# Patient Record
Sex: MALE | Race: WHITE | NOT HISPANIC OR LATINO | Employment: UNEMPLOYED | URBAN - METROPOLITAN AREA
[De-identification: names, ages, dates, MRNs, and addresses within clinical notes are randomized per-mention and may not be internally consistent; named-entity substitution may affect disease eponyms.]

---

## 2021-04-22 ENCOUNTER — APPOINTMENT (OUTPATIENT)
Dept: GENERAL RADIOLOGY | Facility: HOSPITAL | Age: 41
End: 2021-04-22

## 2021-04-22 ENCOUNTER — HOSPITAL ENCOUNTER (EMERGENCY)
Facility: HOSPITAL | Age: 41
Discharge: HOME | End: 2021-04-22
Attending: EMERGENCY MEDICINE | Admitting: EMERGENCY MEDICINE

## 2021-04-22 VITALS
BODY MASS INDEX: 35.78 KG/M2 | HEART RATE: 91 BPM | WEIGHT: 270 LBS | DIASTOLIC BLOOD PRESSURE: 84 MMHG | RESPIRATION RATE: 18 BRPM | OXYGEN SATURATION: 96 % | HEIGHT: 73 IN | SYSTOLIC BLOOD PRESSURE: 161 MMHG | TEMPERATURE: 98.1 F

## 2021-04-22 DIAGNOSIS — R00.2 PALPITATIONS: Primary | ICD-10-CM

## 2021-04-22 DIAGNOSIS — I49.1 PAC (PREMATURE ATRIAL CONTRACTION): ICD-10-CM

## 2021-04-22 LAB
ALBUMIN SERPL-MCNC: 4.8 G/DL (ref 3.5–5.2)
ALBUMIN/GLOB SERPL: 1.7 G/DL
ALP SERPL-CCNC: 99 U/L (ref 39–117)
ALT SERPL W P-5'-P-CCNC: 30 U/L (ref 1–41)
ANION GAP SERPL CALCULATED.3IONS-SCNC: 11 MMOL/L (ref 5–15)
AST SERPL-CCNC: 24 U/L (ref 1–40)
BASOPHILS # BLD AUTO: 0.07 10*3/MM3 (ref 0–0.2)
BASOPHILS NFR BLD AUTO: 1 % (ref 0–1.5)
BILIRUB SERPL-MCNC: 0.4 MG/DL (ref 0–1.2)
BUN SERPL-MCNC: 18 MG/DL (ref 6–20)
BUN/CREAT SERPL: 20 (ref 7–25)
CALCIUM SPEC-SCNC: 9.6 MG/DL (ref 8.6–10.5)
CHLORIDE SERPL-SCNC: 102 MMOL/L (ref 98–107)
CO2 SERPL-SCNC: 27 MMOL/L (ref 22–29)
CREAT SERPL-MCNC: 0.9 MG/DL (ref 0.76–1.27)
DEPRECATED RDW RBC AUTO: 38.7 FL (ref 37–54)
EOSINOPHIL # BLD AUTO: 0.27 10*3/MM3 (ref 0–0.4)
EOSINOPHIL NFR BLD AUTO: 3.8 % (ref 0.3–6.2)
ERYTHROCYTE [DISTWIDTH] IN BLOOD BY AUTOMATED COUNT: 12.5 % (ref 12.3–15.4)
GFR SERPL CREATININE-BSD FRML MDRD: 94 ML/MIN/1.73
GLOBULIN UR ELPH-MCNC: 2.8 GM/DL
GLUCOSE SERPL-MCNC: 107 MG/DL (ref 65–99)
HCT VFR BLD AUTO: 39.5 % (ref 37.5–51)
HGB BLD-MCNC: 13.3 G/DL (ref 13–17.7)
HOLD SPECIMEN: NORMAL
IMM GRANULOCYTES # BLD AUTO: 0.02 10*3/MM3 (ref 0–0.05)
IMM GRANULOCYTES NFR BLD AUTO: 0.3 % (ref 0–0.5)
LYMPHOCYTES # BLD AUTO: 2.23 10*3/MM3 (ref 0.7–3.1)
LYMPHOCYTES NFR BLD AUTO: 31.7 % (ref 19.6–45.3)
MAGNESIUM SERPL-MCNC: 2.1 MG/DL (ref 1.6–2.6)
MCH RBC QN AUTO: 28.8 PG (ref 26.6–33)
MCHC RBC AUTO-ENTMCNC: 33.7 G/DL (ref 31.5–35.7)
MCV RBC AUTO: 85.5 FL (ref 79–97)
MONOCYTES # BLD AUTO: 0.71 10*3/MM3 (ref 0.1–0.9)
MONOCYTES NFR BLD AUTO: 10.1 % (ref 5–12)
NEUTROPHILS NFR BLD AUTO: 3.74 10*3/MM3 (ref 1.7–7)
NEUTROPHILS NFR BLD AUTO: 53.1 % (ref 42.7–76)
NRBC BLD AUTO-RTO: 0 /100 WBC (ref 0–0.2)
NT-PROBNP SERPL-MCNC: 32.2 PG/ML (ref 0–450)
PLATELET # BLD AUTO: 228 10*3/MM3 (ref 140–450)
PMV BLD AUTO: 10 FL (ref 6–12)
POTASSIUM SERPL-SCNC: 3.7 MMOL/L (ref 3.5–5.2)
PROT SERPL-MCNC: 7.6 G/DL (ref 6–8.5)
QT INTERVAL: 352 MS
QTC INTERVAL: 456 MS
RBC # BLD AUTO: 4.62 10*6/MM3 (ref 4.14–5.8)
SODIUM SERPL-SCNC: 140 MMOL/L (ref 136–145)
TROPONIN T SERPL-MCNC: <0.01 NG/ML (ref 0–0.03)
TSH SERPL DL<=0.05 MIU/L-ACNC: 2.74 UIU/ML (ref 0.27–4.2)
WBC # BLD AUTO: 7.04 10*3/MM3 (ref 3.4–10.8)
WHOLE BLOOD HOLD SPECIMEN: NORMAL
WHOLE BLOOD HOLD SPECIMEN: NORMAL

## 2021-04-22 PROCEDURE — 93005 ELECTROCARDIOGRAM TRACING: CPT | Performed by: EMERGENCY MEDICINE

## 2021-04-22 PROCEDURE — 84443 ASSAY THYROID STIM HORMONE: CPT | Performed by: EMERGENCY MEDICINE

## 2021-04-22 PROCEDURE — 85025 COMPLETE CBC W/AUTO DIFF WBC: CPT | Performed by: EMERGENCY MEDICINE

## 2021-04-22 PROCEDURE — 80053 COMPREHEN METABOLIC PANEL: CPT | Performed by: EMERGENCY MEDICINE

## 2021-04-22 PROCEDURE — 83735 ASSAY OF MAGNESIUM: CPT | Performed by: EMERGENCY MEDICINE

## 2021-04-22 PROCEDURE — 99284 EMERGENCY DEPT VISIT MOD MDM: CPT

## 2021-04-22 PROCEDURE — 83880 ASSAY OF NATRIURETIC PEPTIDE: CPT | Performed by: EMERGENCY MEDICINE

## 2021-04-22 PROCEDURE — 84484 ASSAY OF TROPONIN QUANT: CPT | Performed by: EMERGENCY MEDICINE

## 2021-04-22 RX ORDER — LOSARTAN POTASSIUM AND HYDROCHLOROTHIAZIDE 25; 100 MG/1; MG/1
1 TABLET ORAL DAILY
COMMUNITY
End: 2022-08-02 | Stop reason: SDUPTHER

## 2021-04-22 RX ORDER — SODIUM CHLORIDE 0.9 % (FLUSH) 0.9 %
10 SYRINGE (ML) INJECTION AS NEEDED
Status: DISCONTINUED | OUTPATIENT
Start: 2021-04-22 | End: 2021-04-22 | Stop reason: HOSPADM

## 2021-04-22 RX ORDER — PRAVASTATIN SODIUM 40 MG
40 TABLET ORAL NIGHTLY
COMMUNITY
End: 2022-08-02 | Stop reason: SDUPTHER

## 2021-04-22 RX ORDER — HYDROXYZINE HYDROCHLORIDE 25 MG/1
25 TABLET, FILM COATED ORAL 3 TIMES DAILY PRN
COMMUNITY
End: 2021-04-26 | Stop reason: CLARIF

## 2021-04-22 RX ORDER — BUPRENORPHINE HYDROCHLORIDE AND NALOXONE HYDROCHLORIDE DIHYDRATE 8; 2 MG/1; MG/1
1 TABLET SUBLINGUAL DAILY
COMMUNITY
End: 2022-09-08

## 2021-04-22 RX ORDER — SERTRALINE HYDROCHLORIDE 100 MG/1
100 TABLET, FILM COATED ORAL DAILY
COMMUNITY
End: 2022-09-09

## 2021-04-22 RX ORDER — PANTOPRAZOLE SODIUM 20 MG/1
20 TABLET, DELAYED RELEASE ORAL DAILY
COMMUNITY
End: 2022-08-02 | Stop reason: SDUPTHER

## 2021-04-22 RX ORDER — BUPROPION HYDROCHLORIDE 150 MG/1
150 TABLET, EXTENDED RELEASE ORAL 2 TIMES DAILY
COMMUNITY
End: 2021-04-26

## 2021-04-22 NOTE — ED PROVIDER NOTES
Subjective   Mr. Jeffers 40-year-old male presents the emergency department today having palpitations.  He states palpitations been going off and on for about the past year seem to be getting progressively worse.  He does not have chest pain associated with these.  He states he is actually cut back on caffeine and drinks very little because of these.  He states these do not make him short of breath but he is very aware of each episode.  The episodes only last for few seconds and then resolve spontaneously.  He does not smoke.  He does have a past medical history for drug abuse but has been clean for years.  Reports that he does have a history of hypertension and hyperlipidemia and anxiety.  No prior history of previous cardiac work-up including heart cath or stress test.  Nothing seems to exacerbate these other than thinking about it nothing seems to alleviate them.      History provided by:  Patient   used: No    Palpitations  Palpitations quality:  Irregular  Onset quality:  Sudden  Timing:  Intermittent  Progression:  Waxing and waning  Chronicity:  Recurrent  Context: anxiety    Context: not appetite suppressants, not blood loss, not bronchodilators, not caffeine, not dehydration, not exercise, not hyperventilation, not illicit drugs, not nicotine and not stimulant use    Relieved by:  Nothing  Worsened by:  Nothing  Ineffective treatments:  None tried  Associated symptoms: no back pain, no chest pressure, no diaphoresis, no dizziness, no hemoptysis, no lower extremity edema, no malaise/fatigue, no nausea, no near-syncope, no numbness, no PND, no shortness of breath, no vomiting and no weakness    Risk factors: stress    Risk factors: no diabetes mellitus, no heart disease, no hx of PE, no hx of thyroid disease, no hypercoagulable state and no hyperthyroidism        Review of Systems   Constitutional: Negative for diaphoresis, fever and malaise/fatigue.   Respiratory: Negative for hemoptysis,  chest tightness, shortness of breath and wheezing.    Cardiovascular: Positive for palpitations. Negative for PND and near-syncope.   Gastrointestinal: Negative for nausea and vomiting.   Genitourinary: Negative for dysuria, frequency and urgency.   Musculoskeletal: Negative for back pain and neck pain.   Skin: Negative for pallor and rash.   Neurological: Negative for dizziness, weakness and numbness.   Psychiatric/Behavioral: Negative.    All other systems reviewed and are negative.      Past Medical History:   Diagnosis Date   • Anxiety    • Hyperlipidemia    • Hypertension        No Known Allergies    History reviewed. No pertinent surgical history.    History reviewed. No pertinent family history.    Social History     Socioeconomic History   • Marital status:      Spouse name: Not on file   • Number of children: Not on file   • Years of education: Not on file   • Highest education level: Not on file   Tobacco Use   • Smoking status: Never Smoker   Substance and Sexual Activity   • Alcohol use: Never   • Drug use: Never           Objective   Physical Exam  Vitals and nursing note reviewed.   Constitutional:       Appearance: He is well-developed.      Comments: Warm pink dry afebrile noted on the monitor patient is having frequent PACs and these do correlate with the sensation has been having.   HENT:      Head: Normocephalic and atraumatic.      Right Ear: External ear normal.      Left Ear: External ear normal.      Nose: Nose normal.   Eyes:      General: No scleral icterus.     Conjunctiva/sclera: Conjunctivae normal.      Pupils: Pupils are equal, round, and reactive to light.   Neck:      Thyroid: No thyromegaly.   Cardiovascular:      Rate and Rhythm: Normal rate and regular rhythm.      Heart sounds: Normal heart sounds.   Pulmonary:      Effort: Pulmonary effort is normal. No respiratory distress.      Breath sounds: Normal breath sounds. No wheezing or rales.   Chest:      Chest wall: No  tenderness.   Abdominal:      General: Bowel sounds are normal. There is no distension.      Palpations: Abdomen is soft.      Tenderness: There is no abdominal tenderness.   Musculoskeletal:         General: Normal range of motion.      Cervical back: Normal range of motion.   Lymphadenopathy:      Cervical: No cervical adenopathy.   Skin:     General: Skin is warm and dry.   Neurological:      Mental Status: He is alert and oriented to person, place, and time.      Cranial Nerves: No cranial nerve deficit.      Coordination: Coordination normal.      Deep Tendon Reflexes: Reflexes are normal and symmetric. Reflexes normal.   Psychiatric:         Behavior: Behavior normal.         Thought Content: Thought content normal.         Judgment: Judgment normal.         Procedures           ED Course                                   Recent Results (from the past 24 hour(s))   ECG 12 Lead    Collection Time: 04/22/21  1:59 PM   Result Value Ref Range    QT Interval 352 ms    QTC Interval 456 ms   Comprehensive Metabolic Panel    Collection Time: 04/22/21  2:08 PM    Specimen: Blood   Result Value Ref Range    Glucose 107 (H) 65 - 99 mg/dL    BUN 18 6 - 20 mg/dL    Creatinine 0.90 0.76 - 1.27 mg/dL    Sodium 140 136 - 145 mmol/L    Potassium 3.7 3.5 - 5.2 mmol/L    Chloride 102 98 - 107 mmol/L    CO2 27.0 22.0 - 29.0 mmol/L    Calcium 9.6 8.6 - 10.5 mg/dL    Total Protein 7.6 6.0 - 8.5 g/dL    Albumin 4.80 3.50 - 5.20 g/dL    ALT (SGPT) 30 1 - 41 U/L    AST (SGOT) 24 1 - 40 U/L    Alkaline Phosphatase 99 39 - 117 U/L    Total Bilirubin 0.4 0.0 - 1.2 mg/dL    eGFR Non African Amer 94 >60 mL/min/1.73    Globulin 2.8 gm/dL    A/G Ratio 1.7 g/dL    BUN/Creatinine Ratio 20.0 7.0 - 25.0    Anion Gap 11.0 5.0 - 15.0 mmol/L   Magnesium    Collection Time: 04/22/21  2:08 PM    Specimen: Blood   Result Value Ref Range    Magnesium 2.1 1.6 - 2.6 mg/dL   Troponin    Collection Time: 04/22/21  2:08 PM    Specimen: Blood   Result Value  Ref Range    Troponin T <0.010 0.000 - 0.030 ng/mL   TSH    Collection Time: 04/22/21  2:08 PM    Specimen: Blood   Result Value Ref Range    TSH 2.740 0.270 - 4.200 uIU/mL   BNP    Collection Time: 04/22/21  2:08 PM    Specimen: Blood   Result Value Ref Range    proBNP 32.2 0.0 - 450.0 pg/mL   Light Blue Top    Collection Time: 04/22/21  2:08 PM   Result Value Ref Range    Extra Tube hold for add-on    Green Top (Gel)    Collection Time: 04/22/21  2:08 PM   Result Value Ref Range    Extra Tube Hold for add-ons.    Lavender Top    Collection Time: 04/22/21  2:08 PM   Result Value Ref Range    Extra Tube hold for add-on    Gold Top - SST    Collection Time: 04/22/21  2:08 PM   Result Value Ref Range    Extra Tube Hold for add-ons.    CBC Auto Differential    Collection Time: 04/22/21  2:08 PM    Specimen: Blood   Result Value Ref Range    WBC 7.04 3.40 - 10.80 10*3/mm3    RBC 4.62 4.14 - 5.80 10*6/mm3    Hemoglobin 13.3 13.0 - 17.7 g/dL    Hematocrit 39.5 37.5 - 51.0 %    MCV 85.5 79.0 - 97.0 fL    MCH 28.8 26.6 - 33.0 pg    MCHC 33.7 31.5 - 35.7 g/dL    RDW 12.5 12.3 - 15.4 %    RDW-SD 38.7 37.0 - 54.0 fl    MPV 10.0 6.0 - 12.0 fL    Platelets 228 140 - 450 10*3/mm3    Neutrophil % 53.1 42.7 - 76.0 %    Lymphocyte % 31.7 19.6 - 45.3 %    Monocyte % 10.1 5.0 - 12.0 %    Eosinophil % 3.8 0.3 - 6.2 %    Basophil % 1.0 0.0 - 1.5 %    Immature Grans % 0.3 0.0 - 0.5 %    Neutrophils, Absolute 3.74 1.70 - 7.00 10*3/mm3    Lymphocytes, Absolute 2.23 0.70 - 3.10 10*3/mm3    Monocytes, Absolute 0.71 0.10 - 0.90 10*3/mm3    Eosinophils, Absolute 0.27 0.00 - 0.40 10*3/mm3    Basophils, Absolute 0.07 0.00 - 0.20 10*3/mm3    Immature Grans, Absolute 0.02 0.00 - 0.05 10*3/mm3    nRBC 0.0 0.0 - 0.2 /100 WBC     Note: In addition to lab results from this visit, the labs listed above may include labs taken at another facility or during a different encounter within the last 24 hours. Please correlate lab times with ED admission and  discharge times for further clarification of the services performed during this visit.    No orders to display     Vitals:    04/22/21 1430 04/22/21 1445 04/22/21 1500 04/22/21 1501   BP: 166/92  161/84    BP Location:       Patient Position:       Pulse: 105 92  91   Resp:       Temp:       TempSrc:       SpO2: 97% 96% 96% 96%   Weight:       Height:         Medications   sodium chloride 0.9 % flush 10 mL (has no administration in time range)     ECG/EMG Results (last 24 hours)     Procedure Component Value Units Date/Time    ECG 12 Lead [[id number]] Collected: 04/22/21 1359     Updated: 04/22/21 1512        ECG 12 Lead   Final Result   Test Reason : HEART PALPS   Blood Pressure : **/** mmHG   Vent. Rate : 101 BPM     Atrial Rate : 101 BPM      P-R Int : 154 ms          QRS Dur : 110 ms       QT Int : 352 ms       P-R-T Axes : 046 077 042 degrees      QTc Int : 456 ms      Sinus tachycardia   Otherwise normal ECG   No previous ECGs available   Confirmed by RICHI MEDRANO MD ([id number]) on 4/22/2021 4:06:25 PM      Referred By:  EDMD           Confirmed By:RICHI MEDRANO MD                  MDM  Number of Diagnoses or Management Options  PAC (premature atrial contraction): new and requires workup  Palpitations: new and requires workup     Amount and/or Complexity of Data Reviewed  Clinical lab tests: reviewed and ordered  Tests in the radiology section of CPT®: reviewed and ordered  Tests in the medicine section of CPT®: reviewed and ordered  Discuss the patient with other providers: yes    Patient Progress  Patient progress: stable      Final diagnoses:   Palpitations   PAC (premature atrial contraction)       ED Disposition  ED Disposition     ED Disposition Condition Comment    Discharge Stable           [facility] CARDIOLOG[facility]  [address]  [facility] [address]  [phone]        Kaden Vázquez MD  [address] [place] [address] [address] [place] [address]  [phone]      Call for  appointment         Medication List      No changes were made to your prescriptions during this visit.          Sami Moy PA  04/22/21 2531

## 2021-04-23 NOTE — PROGRESS NOTES
"Chief Complaint  Establish Care and Palpitations    Subjective    History of Present Illness {CC  Problem List  Visit  Diagnosis   Encounters  Notes  Medications  Labs  Result Review Imaging  Media :23}     Iván Jeffers presents to [facility]ARDIOLOG[facility] for   History of Present Illness   40-year-old male with anxiety, hypertension and hyperlipidemia who presents today for evaluation of palpitations at the request of DESIREE Frank.  Patient presented to the ED on 4/22 with worsening intermittent palpitations.  He reports he has had intermittent palpitation for the past year but seem to be getting progressively worse.  He says he is actually cut back on caffeine with no significant improvement.  He denies any associated chest pain or shortness of breath. Work up in ED unremarkable. He reports that he stopped his buproprion and reports since then he has had no further palpitations.  Overall he says he feels really well.  He is making positive changes in his lifestyle.  He reports that he does vape and smoke but is working to quit both.  He typically drinks 1 cup of coffee and 1 soda a day.  He currently stays at home with his children but used to work as a paramedic and would drink [employer] drinks and excessive caffeine and stay up all night.  No history of cardiac evaluation in the past.  He reports he monitors his blood pressure at home typically is around 1 20-1 30  Objective     Vital Signs:   Vitals:    04/26/21 1309 04/26/21 1313 04/26/21 1315   BP: 148/81 171/94 162/91   BP Location: Right arm Left arm Left arm   Patient Position: Sitting Standing Sitting   Cuff Size: Large Adult Large Adult Large Adult   Pulse: 92 96 96   Resp:   18   Temp:   99 °F (37.2 °C)   TempSrc:   Temporal   SpO2: 97% 98% 99%   Weight:   122 kg (269 lb 8 oz)   Height:   185.4 cm (73\")     Body mass index is 35.56 kg/m².  Physical Exam  Vitals reviewed.   Constitutional:       Appearance: Normal appearance. "   HENT:      Head: Normocephalic.   Eyes:      Extraocular Movements: Extraocular movements intact.      Pupils: Pupils are equal, round, and reactive to light.   Neck:      Vascular: No carotid bruit.   Cardiovascular:      Rate and Rhythm: Normal rate and regular rhythm.      Pulses: Normal pulses.      Heart sounds: Normal heart sounds, S1 normal and S2 normal. No murmur heard.     Pulmonary:      Effort: Pulmonary effort is normal. No respiratory distress.      Breath sounds: Normal breath sounds.   Chest:      Chest wall: No tenderness.   Abdominal:      General: Abdomen is flat. Bowel sounds are normal.      Palpations: Abdomen is soft.   Musculoskeletal:      Cervical back: Neck supple.      Right lower leg: No edema.      Left lower leg: No edema.   Skin:     General: Skin is warm and dry.   Neurological:      General: No focal deficit present.      Mental Status: He is alert and oriented to person, place, and time. Mental status is at baseline.   Psychiatric:         Mood and Affect: Mood normal.         Behavior: Behavior normal.         Thought Content: Thought content normal.              Result Review  Data Reviewed:{ Labs  Result Review  Imaging  Med Tab  Media :23}   BNP (04/22/2021 14:08)  TSH (04/22/2021 14:08)  Troponin (04/22/2021 14:08)  Magnesium (04/22/2021 14:08)  Comprehensive Metabolic Panel (04/22/2021 14:08)  CBC & Differential (04/22/2021 14:08)  ECG 12 Lead (04/22/2021 13:59)    Consultant notes Sami Moy PAc            Assessment and Plan {CC Problem List  Visit Diagnosis  ROS  Review (Popup)  Health Maintenance  Quality  BestPractice  Medications  SmartSets  SnapShot Encounters  Media :23}   1. Palpitations  Symptoms resolved since stopping Wellbutrin  Since he is having no symptoms will defer cardiac monitor at this time.  Advised him to call with any recurrent symptoms    2. Essential hypertension  Elevated today but he climb multiple flights of stairs and  reports his blood pressure is usually elevated at doctor's office.  Home blood pressures are well controlled.  Continue losartan/HCTZ.  Encouraged weight loss, regular exercise and healthy diet    3. Hyperlipidemia, unspecified hyperlipidemia type  Continue statin therapy    4. Tobacco abuse  Discussed cardiac risks including hypertension, hyperlipidemia and tobacco abuse.  Strongly encouraged tobacco cessation.  He is ready and working to quit.        Follow Up {Instructions Charge Capture  Follow-up Communications :23}   No follow-ups on file.    Patient was given instructions and counseling regarding his condition or for health maintenance advice. Please see specific information pulled into the AVS if appropriate.  Patient was instructed to call the [facility] with any questions, concerns, or worsening symptoms.    *Please note that portions of this note were completed with a voice recognition program. Efforts were made to edit the dictations, but occasionally words are mistranscribed.

## 2021-04-26 ENCOUNTER — HOSPITAL ENCOUNTER (OUTPATIENT)
Dept: CARDIOLOGY | Facility: HOSPITAL | Age: 41
Discharge: HOME | End: 2021-04-26

## 2021-04-26 ENCOUNTER — OFFICE VISIT (OUTPATIENT)
Dept: CARDIOLOGY | Facility: HOSPITAL | Age: 41
End: 2021-04-26

## 2021-04-26 VITALS
HEART RATE: 96 BPM | TEMPERATURE: 99 F | HEIGHT: 73 IN | BODY MASS INDEX: 35.72 KG/M2 | DIASTOLIC BLOOD PRESSURE: 91 MMHG | OXYGEN SATURATION: 99 % | RESPIRATION RATE: 18 BRPM | SYSTOLIC BLOOD PRESSURE: 162 MMHG | WEIGHT: 269.5 LBS

## 2021-04-26 DIAGNOSIS — R00.2 PALPITATIONS: ICD-10-CM

## 2021-04-26 DIAGNOSIS — E78.5 HYPERLIPIDEMIA, UNSPECIFIED HYPERLIPIDEMIA TYPE: ICD-10-CM

## 2021-04-26 DIAGNOSIS — R00.2 PALPITATIONS: Primary | ICD-10-CM

## 2021-04-26 DIAGNOSIS — Z72.0 TOBACCO ABUSE: ICD-10-CM

## 2021-04-26 DIAGNOSIS — I10 ESSENTIAL HYPERTENSION: ICD-10-CM

## 2021-04-26 PROCEDURE — 93246 EXT ECG>7D<15D RECORDING: CPT

## 2021-04-26 PROCEDURE — 99203 OFFICE O/P NEW LOW 30 MIN: CPT | Performed by: NURSE PRACTITIONER

## 2021-04-26 RX ORDER — BUPROPION HYDROCHLORIDE 300 MG/1
300 TABLET ORAL DAILY
COMMUNITY
Start: 2021-04-12 | End: 2021-04-26 | Stop reason: HOSPADM

## 2021-04-26 RX ORDER — HYDROXYZINE PAMOATE 25 MG/1
25 CAPSULE ORAL 3 TIMES DAILY PRN
COMMUNITY
Start: 2021-04-16 | End: 2023-03-10 | Stop reason: SDUPTHER

## 2021-04-29 ENCOUNTER — TELEPHONE (OUTPATIENT)
Dept: CARDIOLOGY | Facility: HOSPITAL | Age: 41
End: 2021-04-29

## 2021-04-29 DIAGNOSIS — R00.2 PALPITATIONS: Primary | ICD-10-CM

## 2021-04-29 NOTE — TELEPHONE ENCOUNTER
I have placed order for 2 week holter. Will you please send to him and make sure that he gets a 6 week follow up with me via telemedicine?

## 2021-04-29 NOTE — TELEPHONE ENCOUNTER
[facility] Heart Monitor Documentation    Iván Jeffers  1980  [id number]  04/29/21    CHACHA Higgins    [] ZIO XT Patch  Model [id number] Prescribed for N/A Days    · Serial Number: (N + 9 Digits) N   · Apply-By Date on Box:   · USPS Tracking Number:   · USPS Tracking        [] [employer]ian MINI PLUS Mobile Cardiac Telemetry  Model BGMINIPLUS Prescribed for N/A Days    · Serial Number: (BGM + 7 Digits) BGM  · Shipped-By Date on Box:   · UPS Tracking Number: 1Z  · UPS Tracking      [x] [employer] BodyGuardian MINI Holter Monitor  Model BGMINIEL Prescribed for N/A Days    · Serial Number: (7 Digits)   · Shipped-By Date on Box:   · UPS Tracking Number: 1Z [employer] to send  · UPS Tracking        This monitor was applied to the patient's chest and checked for proper functioning.  Mr. Iván Jeffers was instructed in the proper use of this monitor.  He was given the opportunity to ask questions and left the office with the device 's instruction manual.    Tamara Valentino MA, 11:35 EDT, 04/29/21                  [facility]

## 2021-06-07 NOTE — PROGRESS NOTES
"Chief Complaint  Follow-up (Monitor results)  This was an audio and video enabled telemedicine encounter.    Subjective    History of Present Illness {CC  Problem List  Visit  Diagnosis   Encounters  Notes  Medications  Labs  Result Review Imaging  Media :23}     Iván Jeffers presents to [facility]IOLOG[facility] for   History of Present Illness   40-year-old male with anxiety, hypertension and hyperlipidemia who presents today to follow up on palpitations.  He wore a Holter monitor for 14 days which was negative for arrhythmias.  Triggered events were normal sinus rhythm, occasional PACs and PVCs. He reports since stopping vaping, his palpitations have resolved.  He strongly desires to quit smoking.      Objective     Vital Signs:   Vitals:    06/08/21 0842   BP: 132/82   BP Location: Left arm   Patient Position: Sitting   Pulse: 88   Weight: 122 kg (270 lb 1 oz)   Height: 185.4 cm (73\")     Body mass index is 35.63 kg/m².  Physical Exam  Constitutional:       Appearance: Normal appearance.   HENT:      Head: Normocephalic.   Pulmonary:      Effort: Pulmonary effort is normal. No respiratory distress.   Neurological:      Mental Status: He is alert and oriented to person, place, and time.   Psychiatric:         Mood and Affect: Mood normal.         Behavior: Behavior normal.         Thought Content: Thought content normal.              Result Review  Data Reviewed:{ Labs  Result Review  Imaging  Med Tab  Media :23}   Extended Holter monitor x14 days 5/2021 showed average heart of 78, minimum rate of 48 and a maximal rate of 130 bpm.  There were rare PACs and PVCs.  No significant arrhythmias noted.  Triggered events were normal sinus rhythm, occasional PACs and PVCs           Assessment and Plan {CC Problem List  Visit Diagnosis  ROS  Review (Popup)  Health Maintenance  Quality  BestPractice  Medications  SmartSets  SnapShot Encounters  Media :23}   1. Palpitations  Symptoms " have resolved. Symptoms mostly NSR and PACs.  Encouraged to avoid vaping, cardiac stimulants, minimize caffeine and encouraged weight loss.     2. Essential hypertension  Appears controlled  Continue to monitor   Discussed importance of good BP control with most SBPs 130 or less    3. Tobacco abuse  Smoking cessation counseling provided for 5 minutes.  Various cessation strategies discussed including nicotine replacement therapy, bupropion, chantix. Discussed risks of ongoing tobacco abuse. He would like to try Chantix.  Discussed potential side effects and risk versus benefits.  He agrees to proceed.      Follow Up {Instructions Charge Capture  Follow-up Communications :23}   No follow-ups on file.    Patient was given instructions and counseling regarding his condition or for health maintenance advice. Please see specific information pulled into the AVS if appropriate.  Patient was instructed to call the [facility] with any questions, concerns, or worsening symptoms.    *Please note that portions of this note were completed with a voice recognition program. Efforts were made to edit the dictations, but occasionally words are mistranscribed.

## 2021-06-08 ENCOUNTER — TELEMEDICINE (OUTPATIENT)
Dept: CARDIOLOGY | Facility: HOSPITAL | Age: 41
End: 2021-06-08

## 2021-06-08 VITALS
SYSTOLIC BLOOD PRESSURE: 132 MMHG | BODY MASS INDEX: 35.79 KG/M2 | DIASTOLIC BLOOD PRESSURE: 82 MMHG | HEIGHT: 73 IN | HEART RATE: 88 BPM | WEIGHT: 270.06 LBS

## 2021-06-08 DIAGNOSIS — Z72.0 TOBACCO ABUSE: ICD-10-CM

## 2021-06-08 DIAGNOSIS — I10 ESSENTIAL HYPERTENSION: ICD-10-CM

## 2021-06-08 DIAGNOSIS — R00.2 PALPITATIONS: Primary | ICD-10-CM

## 2021-06-08 PROCEDURE — 99213 OFFICE O/P EST LOW 20 MIN: CPT | Performed by: NURSE PRACTITIONER

## 2021-06-08 RX ORDER — VARENICLINE TARTRATE 1 MG/1
1 TABLET, FILM COATED ORAL 2 TIMES DAILY
Qty: 56 TABLET | Refills: 1 | Status: SHIPPED | OUTPATIENT
Start: 2021-07-06 | End: 2021-08-31

## 2021-06-17 PROCEDURE — 93248 EXT ECG>7D<15D REV&INTERPJ: CPT | Performed by: INTERNAL MEDICINE

## 2022-08-02 RX ORDER — PRAVASTATIN SODIUM 40 MG
40 TABLET ORAL NIGHTLY
Qty: 90 TABLET | Refills: 0 | Status: SHIPPED | OUTPATIENT
Start: 2022-08-02 | End: 2022-11-30

## 2022-08-02 RX ORDER — LOSARTAN POTASSIUM AND HYDROCHLOROTHIAZIDE 25; 100 MG/1; MG/1
1 TABLET ORAL DAILY
Qty: 90 TABLET | Refills: 0 | Status: SHIPPED | OUTPATIENT
Start: 2022-08-02 | End: 2022-09-08

## 2022-08-02 RX ORDER — PANTOPRAZOLE SODIUM 20 MG/1
20 TABLET, DELAYED RELEASE ORAL DAILY
Qty: 90 TABLET | Refills: 0 | Status: SHIPPED | OUTPATIENT
Start: 2022-08-02 | End: 2022-11-30

## 2022-08-02 RX ORDER — BUSPIRONE HYDROCHLORIDE 7.5 MG/1
7.5 TABLET ORAL 3 TIMES DAILY
Qty: 90 TABLET | Refills: 0 | Status: SHIPPED | OUTPATIENT
Start: 2022-08-02 | End: 2022-09-09

## 2022-08-02 NOTE — TELEPHONE ENCOUNTER
Appropriate to fill the medicines for 90 days but patient will need follow-up prior to next fill of medicine.

## 2022-08-02 NOTE — TELEPHONE ENCOUNTER
Spoke with patient a he is scheduled with Ana Smart on 10/21/22 @ 10.45. Patient has not had labs drawn in over a year

## 2022-09-08 ENCOUNTER — TELEPHONE (OUTPATIENT)
Dept: FAMILY MEDICINE CLINIC | Facility: CLINIC | Age: 42
End: 2022-09-08

## 2022-09-08 RX ORDER — LOSARTAN POTASSIUM AND HYDROCHLOROTHIAZIDE 12.5; 1 MG/1; MG/1
1 TABLET ORAL DAILY
COMMUNITY
Start: 2022-07-07 | End: 2023-03-10 | Stop reason: SDUPTHER

## 2022-09-08 RX ORDER — BUPRENORPHINE AND NALOXONE 8; 2 MG/1; MG/1
FILM, SOLUBLE BUCCAL; SUBLINGUAL
COMMUNITY
Start: 2022-08-29

## 2022-09-08 RX ORDER — COVID-19 MOLECULAR TEST ASSAY
KIT MISCELLANEOUS
COMMUNITY
Start: 2022-09-06

## 2022-09-08 NOTE — TELEPHONE ENCOUNTER
Caller: Carrie Jeffers    Relationship to patient: Self    Best call back number: [phone]    Chief complaint: SWOLLEN LEGS, MEDICATIONS     Type of visit: NEW PATIENT     Requested date: ASAP    If rescheduling, when is the original appointment: N/A    Additional notes: CARRIE JEFFERS WAS UNDER THE CARE OF KRISTAN FITZGERALD. HE HAS CHILDREN THAT SEE DR ANA FITZGERALD AND HE WOULD LIKE TO BE UNDER HIS CARE AS WELL. PLEASE ADVISE IF THIS CAN BE APPROVED.

## 2022-09-09 ENCOUNTER — OFFICE VISIT (OUTPATIENT)
Dept: FAMILY MEDICINE CLINIC | Facility: CLINIC | Age: 42
End: 2022-09-09

## 2022-09-09 VITALS
OXYGEN SATURATION: 98 % | TEMPERATURE: 98.2 F | RESPIRATION RATE: 12 BRPM | SYSTOLIC BLOOD PRESSURE: 151 MMHG | WEIGHT: 295.4 LBS | BODY MASS INDEX: 40.01 KG/M2 | HEART RATE: 86 BPM | DIASTOLIC BLOOD PRESSURE: 90 MMHG | HEIGHT: 72 IN

## 2022-09-09 DIAGNOSIS — F10.11 ALCOHOL ABUSE, IN REMISSION: ICD-10-CM

## 2022-09-09 DIAGNOSIS — Z11.59 NEED FOR HEPATITIS C SCREENING TEST: ICD-10-CM

## 2022-09-09 DIAGNOSIS — R06.81 APNEA: ICD-10-CM

## 2022-09-09 DIAGNOSIS — E66.01 MORBID OBESITY: ICD-10-CM

## 2022-09-09 DIAGNOSIS — R60.0 LOCALIZED EDEMA: Primary | ICD-10-CM

## 2022-09-09 DIAGNOSIS — Z11.4 SCREENING FOR HIV (HUMAN IMMUNODEFICIENCY VIRUS): ICD-10-CM

## 2022-09-09 DIAGNOSIS — E78.2 MIXED HYPERLIPIDEMIA: ICD-10-CM

## 2022-09-09 DIAGNOSIS — Z23 NEED FOR PROPHYLACTIC VACCINATION AND INOCULATION AGAINST INFLUENZA: ICD-10-CM

## 2022-09-09 DIAGNOSIS — F11.11 OPIOID ABUSE, IN REMISSION: ICD-10-CM

## 2022-09-09 DIAGNOSIS — F17.210 CIGARETTE SMOKER: ICD-10-CM

## 2022-09-09 DIAGNOSIS — I10 PRIMARY HYPERTENSION: ICD-10-CM

## 2022-09-09 PROBLEM — K21.9 GERD WITHOUT ESOPHAGITIS: Status: ACTIVE | Noted: 2022-09-09

## 2022-09-09 PROBLEM — F41.1 GENERALIZED ANXIETY DISORDER: Status: ACTIVE | Noted: 2022-09-09

## 2022-09-09 PROBLEM — F90.9 ATTENTION DEFICIT HYPERACTIVITY DISORDER: Status: ACTIVE | Noted: 2022-09-09

## 2022-09-09 PROBLEM — F11.10 OPIATE ABUSE, EPISODIC (HCC): Status: ACTIVE | Noted: 2022-09-09

## 2022-09-09 PROBLEM — E78.5 HYPERLIPIDEMIA: Status: ACTIVE | Noted: 2022-09-09

## 2022-09-09 PROBLEM — E29.1 TESTICULAR HYPOFUNCTION: Status: ACTIVE | Noted: 2022-09-09

## 2022-09-09 PROBLEM — F42.9 OCD (OBSESSIVE COMPULSIVE DISORDER): Status: ACTIVE | Noted: 2022-09-09

## 2022-09-09 PROCEDURE — 90688 IIV4 VACCINE SPLT 0.5 ML IM: CPT | Performed by: INTERNAL MEDICINE

## 2022-09-09 PROCEDURE — 90471 IMMUNIZATION ADMIN: CPT | Performed by: INTERNAL MEDICINE

## 2022-09-09 PROCEDURE — 99214 OFFICE O/P EST MOD 30 MIN: CPT | Performed by: INTERNAL MEDICINE

## 2022-09-09 RX ORDER — SERTRALINE HYDROCHLORIDE 100 MG/1
100 TABLET, FILM COATED ORAL DAILY
COMMUNITY
End: 2022-12-05 | Stop reason: SDUPTHER

## 2022-09-09 RX ORDER — BUSPIRONE HYDROCHLORIDE 7.5 MG/1
7.5 TABLET ORAL 2 TIMES DAILY
COMMUNITY
End: 2022-10-03

## 2022-09-09 NOTE — PROGRESS NOTES
Follow Up Office Visit      Date: 2022   Patient Name: Iván Jeffers  : 1980   MRN: [id number]     Chief Complaint:    Chief Complaint   Patient presents with   • Leg Swelling       History of Present Illness: Iván Jeffers is a 42 y.o. male who is here with complaint of several months of intermittent dependent edema bilaterally, little bit more on the left than the right, noting he had been drinking alcohol excessively for a number of months and when he stopped drinking this edema seem to get better.  When he resumed it got worse again.  His total consumption was about 8 or 10 shots of bourbon weekly.  He stopped drinking again about a week and a half ago but notes the swelling is not completely gotten better.  He denies chest pains palpitations dyspnea orthopnea or PND.  Upon targeted questioning he does admit that he has had witnessed apnea by his wife, patient having morbid obesity with body habitus typical of sleep apnea.  He is interested in pursuing a sleep study.  He does have hypertension on therapy, including a diuretic, with blood pressures averaging 126-130/75-80.  History of opiate abuse in remission for years, on Suboxone therapy.  History of anxiety disorder generally compensated on BuSpar and sertraline.  Current with COVID-19 vaccine, has not yet had a flu vaccine this fall.  Has not had any labs done for an extensive period of time now..    Subjective      Review of Systems:   Review of Systems    I have reviewed the patients family history, social history, past medical history, past surgical history and have updated it as appropriate.     Medications:     Current Outpatient Medications:   •  buprenorphine-naloxone (SUBOXONE) 8-2 MG film film, , Disp: , Rfl:   •  busPIRone (BUSPAR) 7.5 MG tablet, Take 7.5 mg by mouth 2 (Two) Times a Day., Disp: , Rfl:   •  hydrOXYzine pamoate (VISTARIL) 25 MG capsule, Take 25 mg by mouth 3 (Three) Times a Day As Needed for Anxiety., Disp: , Rfl:   •  " losartan-hydrochlorothiazide (HYZAAR) 100-12.5 MG per tablet, Take 1 tablet by mouth Daily., Disp: , Rfl:   •  pantoprazole (PROTONIX) 20 MG EC tablet, Take 1 tablet by mouth Daily., Disp: 90 tablet, Rfl: 0  •  pravastatin (PRAVACHOL) 40 MG tablet, Take 1 tablet by mouth Every Night., Disp: 90 tablet, Rfl: 0  •  sertraline (ZOLOFT) 100 MG tablet, Take 100 mg by mouth Daily. 1.5 tablets daily, Disp: , Rfl:   •  BinaxNOW COVID-19 Ag Home Test kit, , Disp: , Rfl:     Allergies:   No Known Allergies    Objective     Physical Exam: Please see above  Vital Signs:   Vitals:    09/09/22 1041   BP: 151/90   BP Location: Left arm   Patient Position: Sitting   Cuff Size: Large Adult   Pulse: 86   Resp: 12   Temp: 98.2 °F (36.8 °C)   TempSrc: Temporal   SpO2: 98%   Weight: 134 kg (295 lb 6.4 oz)   Height: 182.2 cm (71.75\")     Body mass index is 40.34 kg/m².  Class 3 Severe Obesity (BMI >=40). Obesity-related health conditions include the following: obstructive sleep apnea, hypertension and dyslipidemias. Obesity is unchanged. BMI is is above average; BMI management plan is completed. We discussed low calorie, low carb based diet program, portion control and increasing exercise.       Physical Exam  General: Taller statured pleasant healthy-appearing 42-year-old white male with a BMI of 40.3.  Oropharynx with Mallampati class IV, upper denture plate, neck circumference 18 inches with no adenopathy masses or tenderness  Lungs clear with no wheeze tachypnea or cough  Cardiac regular rate rhythm with no murmurs gallops or rubs,   Lower extremities having very mild nonpitting's stasis edema in his forelegs extending down to the ankles, no bipedal edema, negative Homans' sign with no significant palpable tenderness in his lower extremities no erythema of the skin.  Procedures    Results:   Labs:   TSH   Date Value Ref Range Status   04/22/2021 2.740 0.270 - 4.200 uIU/mL Final        POCT Results (if applicable):   Results for " orders placed or performed during the hospital encounter of 04/22/21   Comprehensive Metabolic Panel    Specimen: Blood   Result Value Ref Range    Glucose 107 (H) 65 - 99 mg/dL    BUN 18 6 - 20 mg/dL    Creatinine 0.90 0.76 - 1.27 mg/dL    Sodium 140 136 - 145 mmol/L    Potassium 3.7 3.5 - 5.2 mmol/L    Chloride 102 98 - 107 mmol/L    CO2 27.0 22.0 - 29.0 mmol/L    Calcium 9.6 8.6 - 10.5 mg/dL    Total Protein 7.6 6.0 - 8.5 g/dL    Albumin 4.80 3.50 - 5.20 g/dL    ALT (SGPT) 30 1 - 41 U/L    AST (SGOT) 24 1 - 40 U/L    Alkaline Phosphatase 99 39 - 117 U/L    Total Bilirubin 0.4 0.0 - 1.2 mg/dL    eGFR Non African Amer 94 >60 mL/min/1.73    Globulin 2.8 gm/dL    A/G Ratio 1.7 g/dL    BUN/Creatinine Ratio 20.0 7.0 - 25.0    Anion Gap 11.0 5.0 - 15.0 mmol/L   Magnesium    Specimen: Blood   Result Value Ref Range    Magnesium 2.1 1.6 - 2.6 mg/dL   Troponin    Specimen: Blood   Result Value Ref Range    Troponin T <0.010 0.000 - 0.030 ng/mL   TSH    Specimen: Blood   Result Value Ref Range    TSH 2.740 0.270 - 4.200 uIU/mL   BNP    Specimen: Blood   Result Value Ref Range    proBNP 32.2 0.0 - 450.0 pg/mL   CBC Auto Differential    Specimen: Blood   Result Value Ref Range    WBC 7.04 3.40 - 10.80 10*3/mm3    RBC 4.62 4.14 - 5.80 10*6/mm3    Hemoglobin 13.3 13.0 - 17.7 g/dL    Hematocrit 39.5 37.5 - 51.0 %    MCV 85.5 79.0 - 97.0 fL    MCH 28.8 26.6 - 33.0 pg    MCHC 33.7 31.5 - 35.7 g/dL    RDW 12.5 12.3 - 15.4 %    RDW-SD 38.7 37.0 - 54.0 fl    MPV 10.0 6.0 - 12.0 fL    Platelets 228 140 - 450 10*3/mm3    Neutrophil % 53.1 42.7 - 76.0 %    Lymphocyte % 31.7 19.6 - 45.3 %    Monocyte % 10.1 5.0 - 12.0 %    Eosinophil % 3.8 0.3 - 6.2 %    Basophil % 1.0 0.0 - 1.5 %    Immature Grans % 0.3 0.0 - 0.5 %    Neutrophils, Absolute 3.74 1.70 - 7.00 10*3/mm3    Lymphocytes, Absolute 2.23 0.70 - 3.10 10*3/mm3    Monocytes, Absolute 0.71 0.10 - 0.90 10*3/mm3    Eosinophils, Absolute 0.27 0.00 - 0.40 10*3/mm3    Basophils, Absolute  0.07 0.00 - 0.20 10*3/mm3    Immature Grans, Absolute 0.02 0.00 - 0.05 10*3/mm3    nRBC 0.0 0.0 - 0.2 /100 WBC   ECG 12 Lead   Result Value Ref Range    QT Interval 352 ms    QTC Interval 456 ms   Light Blue Top   Result Value Ref Range    Extra Tube hold for add-on    Green Top (Gel)   Result Value Ref Range    Extra Tube Hold for add-ons.    Lavender Top   Result Value Ref Range    Extra Tube hold for add-on    Gold Top - SST   Result Value Ref Range    Extra Tube Hold for add-ons.    Gray Top   Result Value Ref Range    Extra Tube Hold for add-ons.        Imaging:   No valid procedures specified.     Smoking Cessation:   3-10 mintues spent counseling Will try to cut down    Assessment / Plan      Assessment/Plan:   Diagnoses and all orders for this visit:    1. Localized edema (Primary)  -     proBNP; Future  Patient appears to have mild stasis edema, not likely related medications noting he is on low-dose diuretic already.  No clinical signs of a DVT or cellulitis.  Unlikely related to left heart failure though could be potentially related to some right heart failure with suspected ROCKY as detailed below pursuing work-up.  For now keep feet elevated when sitting down, check BNP and make further recommendation accordingly.  2. Mixed hyperlipidemia  -     TSH Rfx On Abnormal To Free T4; Future  -     Hemoglobin A1c; Future  -     Lipid Panel; Future  On statin.  Update lipid profile  3. Primary hypertension  -     TSH Rfx On Abnormal To Free T4; Future  -     Comprehensive Metabolic Panel; Future  -     Hemoglobin A1c; Future  -     Lipid Panel; Future  -     Urinalysis With Culture If Indicated -; Future  Stage II elevation acutely, chronically well controlled on current antihypertensive regimen.  We will continue same.  4. Cigarette smoker  Oertli smoking 2 or 3 cigarettes daily.  Strongly counseled patient regarding need to stop smoking for health risk reduction.  He does seem motivated to do so.  5. Morbid  obesity (HCC)  -     TSH Rfx On Abnormal To Free T4; Future  -     Hemoglobin A1c; Future  Discussed need for lifestyle modification to pursue weight loss.  We will initially try lifestyle change with diet and exercise and if not helpful then we will talk about targeted medication therapy, also consideration in the future for bariatric surgery.  6. Apnea  -     Ambulatory Referral to Cardiology  Witnessed apnea, suspected ROCKY.  Refer to Dr. Ryan or Dr. Clary Martin of sleep medicine/cardiology for further evaluation.  7. Need for hepatitis C screening test  -     Hepatitis C Antibody; Future  Check recommended screen  8. Screening for HIV (human immunodeficiency virus)  -     Human Immunodeficiency Virus 1 & 2 (HIV-1/HIV-2), Qualitative, RNA; Future  Check recommended screen  9. Alcohol abuse, in remission  By history abstaining.  Encouraged ongoing abstinence.  10. Opioid abuse, in remission (HCC)  Doing well on Suboxone by history.  11. Need for prophylactic vaccination and inoculation against influenza  -     Cancel: Fluzone Split Quad (Multi-dose)  -     Fluzone Split Quad (Multi-dose)  Flu vaccine given today.      Follow Up:   Return in about 3 months (around 12/9/2022) for Annual physical.      At [facility], we believe that sharing information builds trust and better relationships. You are receiving this note because you recently visited [facility]. It is possible you will see health information before a provider has talked with you about it. This kind of information can be easy to misunderstand. To help you fully understand what it means for your health, we urge you to discuss this note with your provider.    Kamran Vázquez MD  [facility]

## 2022-09-16 ENCOUNTER — CLINICAL SUPPORT (OUTPATIENT)
Dept: FAMILY MEDICINE CLINIC | Facility: CLINIC | Age: 42
End: 2022-09-16

## 2022-09-16 DIAGNOSIS — E78.2 MIXED HYPERLIPIDEMIA: ICD-10-CM

## 2022-09-16 DIAGNOSIS — Z11.59 NEED FOR HEPATITIS C SCREENING TEST: ICD-10-CM

## 2022-09-16 DIAGNOSIS — Z11.4 SCREENING FOR HIV (HUMAN IMMUNODEFICIENCY VIRUS): ICD-10-CM

## 2022-09-16 DIAGNOSIS — I10 PRIMARY HYPERTENSION: ICD-10-CM

## 2022-09-16 DIAGNOSIS — E66.01 MORBID OBESITY: ICD-10-CM

## 2022-09-16 DIAGNOSIS — R60.0 LOCALIZED EDEMA: ICD-10-CM

## 2022-09-16 PROCEDURE — 36415 COLL VENOUS BLD VENIPUNCTURE: CPT | Performed by: INTERNAL MEDICINE

## 2022-09-16 NOTE — PROGRESS NOTES
Venipuncture Blood Specimen Collection  Venipuncture performed in Left AC by Grace Yan MA with good hemostasis. Patient tolerated the procedure well without complications.   09/16/22   Grace Yan MA

## 2022-09-17 LAB
APPEARANCE UR: CLEAR
BACTERIA #/AREA URNS HPF: NORMAL /[HPF]
BILIRUB UR QL STRIP: NEGATIVE
CASTS URNS QL MICRO: NORMAL /LPF
COLOR UR: YELLOW
EPI CELLS #/AREA URNS HPF: NORMAL /HPF (ref 0–10)
GLUCOSE UR QL STRIP: NEGATIVE
HGB UR QL STRIP: NEGATIVE
KETONES UR QL STRIP: ABNORMAL
LEUKOCYTE ESTERASE UR QL STRIP: NEGATIVE
MICRO URNS: ABNORMAL
MICRO URNS: ABNORMAL
NITRITE UR QL STRIP: NEGATIVE
PH UR STRIP: 5.5 [PH] (ref 5–7.5)
PROT UR QL STRIP: ABNORMAL
RBC #/AREA URNS HPF: NORMAL /HPF (ref 0–2)
SP GR UR STRIP: 1.03 (ref 1–1.03)
URINALYSIS REFLEX: ABNORMAL
UROBILINOGEN UR STRIP-MCNC: 0.2 MG/DL (ref 0.2–1)
WBC #/AREA URNS HPF: NORMAL /HPF (ref 0–5)
WRITTEN AUTHORIZATION: NORMAL

## 2022-09-20 ENCOUNTER — TELEPHONE (OUTPATIENT)
Dept: FAMILY MEDICINE CLINIC | Facility: CLINIC | Age: 42
End: 2022-09-20

## 2022-09-20 LAB
ALBUMIN SERPL-MCNC: 4.7 G/DL (ref 4–5)
ALBUMIN/GLOB SERPL: 1.8 {RATIO} (ref 1.2–2.2)
ALP SERPL-CCNC: 107 IU/L (ref 44–121)
ALT SERPL-CCNC: 30 IU/L (ref 0–44)
AST SERPL-CCNC: 22 IU/L (ref 0–40)
BILIRUB SERPL-MCNC: <0.2 MG/DL (ref 0–1.2)
BUN SERPL-MCNC: 18 MG/DL (ref 6–24)
BUN/CREAT SERPL: 21 (ref 9–20)
CALCIUM SERPL-MCNC: 9.2 MG/DL (ref 8.7–10.2)
CHLORIDE SERPL-SCNC: 101 MMOL/L (ref 96–106)
CHOLEST SERPL-MCNC: 145 MG/DL (ref 100–199)
CO2 SERPL-SCNC: 22 MMOL/L (ref 20–29)
CREAT SERPL-MCNC: 0.87 MG/DL (ref 0.76–1.27)
EGFRCR SERPLBLD CKD-EPI 2021: 111 ML/MIN/1.73
GLOBULIN SER CALC-MCNC: 2.6 G/DL (ref 1.5–4.5)
GLUCOSE SERPL-MCNC: ABNORMAL MG/DL
HBA1C MFR BLD: 5.9 % (ref 4.8–5.6)
HCV AB S/CO SERPL IA: <0.1 S/CO RATIO (ref 0–0.9)
HDLC SERPL-MCNC: 35 MG/DL
HIV 2 RNA SERPL QL NAA+PROBE: NON REACTIVE
HIV1 RNA SERPL QL NAA+PROBE: NON REACTIVE
LDLC SERPL CALC-MCNC: 76 MG/DL (ref 0–99)
NT-PROBNP SERPL-MCNC: 16 PG/ML (ref 0–86)
POTASSIUM SERPL-SCNC: ABNORMAL MMOL/L
PROT SERPL-MCNC: 7.3 G/DL (ref 6–8.5)
SODIUM SERPL-SCNC: 142 MMOL/L (ref 134–144)
TRIGL SERPL-MCNC: 202 MG/DL (ref 0–149)
TSH SERPL DL<=0.005 MIU/L-ACNC: 3.32 UIU/ML (ref 0.45–4.5)
VLDLC SERPL CALC-MCNC: 34 MG/DL (ref 5–40)

## 2022-09-20 NOTE — TELEPHONE ENCOUNTER
Phone conversation with patient regarding recent lab testing obtained including HIV, hep C, CMP all satisfactory noting the lab did not perform a glucose of potassium for technical reasons, lipid profile overall satisfactory other than the very slight elevation of his triglyceride of just over 200 and slightly low HDL of 35, hemoglobin A1c mildly elevated at 5.9%, TSH normal, urinalysis unremarkable.  BNP was also normal.    Assessment/plan:  1.  New diagnosis prediabetes.  Emphasize healthy lifestyle with diet exercise and attempts at weight loss.  Repeat hemoglobin A1c in no longer than 6 months.  We could certainly argue to check it again when he comes in for office visit in 3 months given this is a new diagnosis.  2.  Mixed dyslipidemia, overall satisfactory control.  Again continue current statin dosing healthy lifestyle being pursued.  3.  Dependent edema, noncardiogenic.  Emphasized weight loss.  4.  Remainder of lab testing satisfactory.  5.  Patient acknowledges understanding of test results and is agreeable to the above course of action.

## 2022-10-03 RX ORDER — BUSPIRONE HYDROCHLORIDE 7.5 MG/1
TABLET ORAL
Qty: 90 TABLET | Refills: 0 | Status: SHIPPED | OUTPATIENT
Start: 2022-10-03 | End: 2022-11-28 | Stop reason: SDUPTHER

## 2022-11-18 RX ORDER — LOSARTAN POTASSIUM AND HYDROCHLOROTHIAZIDE 25; 100 MG/1; MG/1
1 TABLET ORAL DAILY
Qty: 90 TABLET | Refills: 3 | Status: SHIPPED | OUTPATIENT
Start: 2022-11-18 | End: 2023-02-03

## 2022-11-28 ENCOUNTER — TELEPHONE (OUTPATIENT)
Dept: FAMILY MEDICINE CLINIC | Facility: CLINIC | Age: 42
End: 2022-11-28

## 2022-11-28 RX ORDER — BUSPIRONE HYDROCHLORIDE 7.5 MG/1
7.5 TABLET ORAL 2 TIMES DAILY
Qty: 60 TABLET | Refills: 1 | Status: SHIPPED | OUTPATIENT
Start: 2022-11-28 | End: 2023-02-01 | Stop reason: SDUPTHER

## 2022-11-30 RX ORDER — PRAVASTATIN SODIUM 40 MG
40 TABLET ORAL NIGHTLY
Qty: 90 TABLET | Refills: 0 | Status: SHIPPED | OUTPATIENT
Start: 2022-11-30 | End: 2023-02-28

## 2022-11-30 RX ORDER — PANTOPRAZOLE SODIUM 20 MG/1
20 TABLET, DELAYED RELEASE ORAL DAILY
Qty: 90 TABLET | Refills: 0 | Status: SHIPPED | OUTPATIENT
Start: 2022-11-30 | End: 2023-03-10 | Stop reason: SDUPTHER

## 2022-12-05 RX ORDER — SERTRALINE HYDROCHLORIDE 100 MG/1
TABLET, FILM COATED ORAL
Qty: 135 TABLET | Refills: 1 | Status: SHIPPED | OUTPATIENT
Start: 2022-12-05 | End: 2023-03-10 | Stop reason: SDUPTHER

## 2022-12-20 ENCOUNTER — DOCUMENTATION (OUTPATIENT)
Dept: FAMILY MEDICINE CLINIC | Facility: CLINIC | Age: 42
End: 2022-12-20

## 2022-12-20 DIAGNOSIS — R91.8 MULTIPLE PULMONARY NODULES: Primary | ICD-10-CM

## 2023-01-30 RX ORDER — BUSPIRONE HYDROCHLORIDE 7.5 MG/1
TABLET ORAL
Qty: 60 TABLET | Refills: 1 | OUTPATIENT
Start: 2023-01-30

## 2023-01-30 RX ORDER — PANTOPRAZOLE SODIUM 20 MG/1
20 TABLET, DELAYED RELEASE ORAL DAILY
Qty: 90 TABLET | Refills: 0 | OUTPATIENT
Start: 2023-01-30

## 2023-02-01 ENCOUNTER — TELEPHONE (OUTPATIENT)
Dept: FAMILY MEDICINE CLINIC | Facility: CLINIC | Age: 43
End: 2023-02-01
Payer: COMMERCIAL

## 2023-02-01 RX ORDER — BUSPIRONE HYDROCHLORIDE 7.5 MG/1
7.5 TABLET ORAL 2 TIMES DAILY
Qty: 60 TABLET | Refills: 0 | Status: SHIPPED | OUTPATIENT
Start: 2023-02-01 | End: 2023-02-28

## 2023-02-01 NOTE — TELEPHONE ENCOUNTER
Caller: Iván Jeffers    Relationship to patient: Self    Best call back number: [phone]    Patient is needing: PATIENT CALLED TO MAKE APPOINTMENT AND WOULD LIKE TO SEE IF HE COULD GET HIS MEDICATIONS HE IS COMPLETELY OUT; HUB ADVISED HE WOULD NEED TO KEEP APPOINTMENT AS WELL    PLEASE ADVISE

## 2023-02-01 NOTE — TELEPHONE ENCOUNTER
I have spoke with him and he has made an appt for 2/3/23. I have filled his rx of buspar for him and have let him know he will need to keep his appt for us to be able to fill again. TF

## 2023-02-03 ENCOUNTER — TELEMEDICINE (OUTPATIENT)
Dept: FAMILY MEDICINE CLINIC | Facility: CLINIC | Age: 43
End: 2023-02-03
Payer: COMMERCIAL

## 2023-02-03 DIAGNOSIS — F41.1 GENERALIZED ANXIETY DISORDER: ICD-10-CM

## 2023-02-03 DIAGNOSIS — F42.9 OBSESSIVE-COMPULSIVE DISORDER, UNSPECIFIED TYPE: ICD-10-CM

## 2023-02-03 DIAGNOSIS — R73.03 PREDIABETES: ICD-10-CM

## 2023-02-03 DIAGNOSIS — G47.33 OSA TREATED WITH BIPAP: Primary | ICD-10-CM

## 2023-02-03 DIAGNOSIS — I10 PRIMARY HYPERTENSION: ICD-10-CM

## 2023-02-03 DIAGNOSIS — F11.10 OPIATE ABUSE, EPISODIC: ICD-10-CM

## 2023-02-03 DIAGNOSIS — E66.01 MORBID OBESITY: ICD-10-CM

## 2023-02-03 PROCEDURE — 99214 OFFICE O/P EST MOD 30 MIN: CPT | Performed by: INTERNAL MEDICINE

## 2023-02-03 NOTE — PROGRESS NOTES
Telehealth E-Visit      Date: 2023   Patient Name: Iván Jeffers  : 1980   MRN: [id number]     Chief Complaint:  No chief complaint on file.      I have reviewed the E-Visit questionnaire and the patient's answers, my assessment and plan are listed below.     This provider is located at the [facility] (through [facility]), [address] [place] [address]. [id number] using a secure "[employer] Video Visit through [employer]. Patient is being seen remotely via telehealth at their home address in [place], and stated they are in a secure environment for this session. The patient's condition being diagnosed/treated is appropriate for telemedicine. The provider identified herself as well as her credentials. The patient, and/or patients guardian, consent to be seen remotely, and when consent is given they understand that the consent allows for patient identifiable information to be sent to a third party as needed. They may refuse to be seen remotely at any time. The electronic data is encrypted and password protected, and the patient and/or guardian has been advised of the potential risks to privacy not withstanding such measures.    You have chosen to receive care through a telehealth visit. Do you consent to use a video/audio connection for your medical care today? Yes    History of Present Illness: Iván Jeffers is a 42 y.o. male who is here today to follow up with routine follow-up visit having been seen last in the office on 2022.  Subsequent to that visit he was referred to get a sleep study which he apparently obtained sometime around 2022 reporting that he had an AHI of 18, data deficient to me, and has now been on CPAP for least the last couple months, indicating he has much better energy and feels great.  He also has a history of hypertension with his blood pressures averaging about 130 over 80s checked most days taking losartan/HCTZ 100/12.5 mg daily.  Denies chest pains  palpitations dyspnea or dizziness, and also notes that his previously noted edema has resolved.  He does have a history of OCD and anxiety for which he takes sertraline 100 mg in the morning and 50 mg in the evening, reportedly having good control of symptoms, does have hydroxyzine to use for breakthrough symptoms.  History of opiate addiction on Suboxone 2 tablets daily reportedly doing well from the standpoint.  Also has prediabetes having had a hemoglobin A1c of 5.9% when checked just over 4 months ago.  Indicates he has been trying to make some dietary changes and trying to be more physically active.  Finally history of obesity, with his last weight here in 9/2022 of 295 pounds, indicating he last weighed himself recently at 280 pounds, albeit on a different scale.      Subjective      Review of Systems:   Review of Systems    I have reviewed and the following portions of the patient's history were updated as appropriate: past family history, past medical history, past social history, past surgical history and problem list.    Medications:     Current Outpatient Medications:   •  BinaxNOW COVID-19 Ag Home Test kit, , Disp: , Rfl:   •  buprenorphine-naloxone (SUBOXONE) 8-2 MG film film, , Disp: , Rfl:   •  busPIRone (BUSPAR) 7.5 MG tablet, Take 1 tablet by mouth 2 (Two) Times a Day., Disp: 60 tablet, Rfl: 0  •  hydrOXYzine pamoate (VISTARIL) 25 MG capsule, Take 25 mg by mouth 3 (Three) Times a Day As Needed for Anxiety., Disp: , Rfl:   •  losartan-hydrochlorothiazide (HYZAAR) 100-12.5 MG per tablet, Take 1 tablet by mouth Daily., Disp: , Rfl:   •  pantoprazole (PROTONIX) 20 MG EC tablet, TAKE 1 TABLET BY MOUTH DAILY, Disp: 90 tablet, Rfl: 0  •  pravastatin (PRAVACHOL) 40 MG tablet, TAKE 1 TABLET BY MOUTH EVERY NIGHT, Disp: 90 tablet, Rfl: 0  •  sertraline (ZOLOFT) 100 MG tablet, Take 1 tablet in morning and 1/2 tablet in evening, Disp: 135 tablet, Rfl: 1    Allergies:   No Known Allergies    Objective      Physical Exam:  Vital Signs: There were no vitals filed for this visit.  There is no height or weight on file to calculate BMI.    Physical Exam  General: Pleasant healthy-appearing 42-year-old white male being evaluated via video telemedicine today, affect appropriate, alert and oriented.  No acute distress.  Cardiac: No indications of chest pains palpitations or dizziness  Pulmonary: No indication of cough wheeze or dyspnea  Neurological exam appears to be grossly normal  Physical exam otherwise not performed given limitations of video telemedicine.    Assessment / Plan      Assessment/Plan:   Diagnoses and all orders for this visit:    1. ROCKY treated with BiPAP (Primary)  Recent diagnosis of ROCKY, per patient report AHI of 18, started on CPAP machine feeling well at this time.  We will obtain copies of his formal sleep study report.  2. Primary hypertension  Blood pressure control reportedly very satisfactory on his losartan/HCTZ 100/12.5 mg daily.  Pursue healthy lifestyle including diet exercise avoidance of salt and further attempts at weight loss  3. Prediabetes  New diagnosis in 9/2022 with a hemoglobin A1c at that time of 5.9%.  Pursue healthy lifestyle.  Advised patient we need to update testing when he comes in for an in-house office visit in the near future.  4. Morbid obesity (HCC)  Patient reports having had 15 pound weight loss since he was last seen here in the office in 9/2022.  I have encouraged ongoing efforts at weight loss with diet and exercise.  Also discussed the concept of initiation of a GLP-1 agonist, which we can discuss further at his follow-up visit.  5. Generalized anxiety disorder  By history doing satisfactorily well taking his sertraline 100 mg, 1 tablet in the morning and half tablet in the evening.  He prefers to split the dose given some sedation on the medication  6. Obsessive-compulsive disorder, unspecified type  Satisfactory control as noted above on sertraline.  7. Opiate  abuse, episodic (HCC)  By history doing well in remission on Suboxone 2 tablets daily       Follow Up:   Return in about 3 months (around 5/3/2023) for Annual physical.    Any medications prescribed have been sent electronically to   [id number] - [place], [place] - [address] - [phone]  - [phone] FX  [address] [place] [address]  Phone: [phone] Fax: [phone]        Kamran Vázquez. MD  [facility]  02/03/23  11:58 EST

## 2023-02-28 RX ORDER — BUSPIRONE HYDROCHLORIDE 7.5 MG/1
TABLET ORAL
Qty: 60 TABLET | Refills: 0 | Status: SHIPPED | OUTPATIENT
Start: 2023-02-28 | End: 2023-03-10 | Stop reason: SDUPTHER

## 2023-02-28 RX ORDER — PRAVASTATIN SODIUM 40 MG
40 TABLET ORAL NIGHTLY
Qty: 90 TABLET | Refills: 0 | Status: SHIPPED | OUTPATIENT
Start: 2023-02-28 | End: 2023-03-10 | Stop reason: SDUPTHER

## 2023-03-10 RX ORDER — HYDROXYZINE PAMOATE 25 MG/1
25 CAPSULE ORAL 3 TIMES DAILY PRN
Qty: 30 CAPSULE | Refills: 3 | Status: SHIPPED | OUTPATIENT
Start: 2023-03-10

## 2023-03-10 RX ORDER — LOSARTAN POTASSIUM AND HYDROCHLOROTHIAZIDE 12.5; 1 MG/1; MG/1
1 TABLET ORAL DAILY
Qty: 30 TABLET | Refills: 3 | Status: SHIPPED | OUTPATIENT
Start: 2023-03-10

## 2023-03-10 RX ORDER — BUSPIRONE HYDROCHLORIDE 7.5 MG/1
7.5 TABLET ORAL 2 TIMES DAILY
Qty: 60 TABLET | Refills: 3 | Status: SHIPPED | OUTPATIENT
Start: 2023-03-10 | End: 2023-03-30

## 2023-03-10 RX ORDER — PRAVASTATIN SODIUM 40 MG
40 TABLET ORAL NIGHTLY
Qty: 90 TABLET | Refills: 1 | Status: SHIPPED | OUTPATIENT
Start: 2023-03-10

## 2023-03-10 RX ORDER — SERTRALINE HYDROCHLORIDE 100 MG/1
TABLET, FILM COATED ORAL
Qty: 135 TABLET | Refills: 1 | Status: SHIPPED | OUTPATIENT
Start: 2023-03-10

## 2023-03-10 RX ORDER — PANTOPRAZOLE SODIUM 20 MG/1
20 TABLET, DELAYED RELEASE ORAL DAILY
Qty: 90 TABLET | Refills: 0 | OUTPATIENT
Start: 2023-03-10

## 2023-03-10 RX ORDER — PANTOPRAZOLE SODIUM 20 MG/1
20 TABLET, DELAYED RELEASE ORAL DAILY
Qty: 90 TABLET | Refills: 1 | Status: SHIPPED | OUTPATIENT
Start: 2023-03-10

## 2023-03-10 NOTE — TELEPHONE ENCOUNTER
Pt. Cancelled or was a no show for last 2 appts.  Needs an appt to ensure dosage/rx is still working or will need changes.

## 2023-03-30 RX ORDER — BUSPIRONE HYDROCHLORIDE 7.5 MG/1
TABLET ORAL
Qty: 60 TABLET | Refills: 3 | Status: SHIPPED | OUTPATIENT
Start: 2023-03-30

## 2023-06-16 RX ORDER — SERTRALINE HYDROCHLORIDE 100 MG/1
TABLET, FILM COATED ORAL
Qty: 135 TABLET | Refills: 1 | Status: SHIPPED | OUTPATIENT
Start: 2023-06-16

## 2023-08-03 RX ORDER — LOSARTAN POTASSIUM AND HYDROCHLOROTHIAZIDE 12.5; 1 MG/1; MG/1
1 TABLET ORAL DAILY
Qty: 30 TABLET | Refills: 3 | Status: SHIPPED | OUTPATIENT
Start: 2023-08-03

## 2023-09-18 ENCOUNTER — OFFICE VISIT (OUTPATIENT)
Dept: FAMILY MEDICINE CLINIC | Facility: CLINIC | Age: 43
End: 2023-09-18
Payer: COMMERCIAL

## 2023-09-18 VITALS
TEMPERATURE: 97.9 F | HEART RATE: 84 BPM | DIASTOLIC BLOOD PRESSURE: 80 MMHG | WEIGHT: 282.8 LBS | BODY MASS INDEX: 38.31 KG/M2 | OXYGEN SATURATION: 98 % | SYSTOLIC BLOOD PRESSURE: 142 MMHG | HEIGHT: 72 IN

## 2023-09-18 DIAGNOSIS — E66.01 MORBID OBESITY: ICD-10-CM

## 2023-09-18 DIAGNOSIS — Z23 NEED FOR TDAP VACCINATION: ICD-10-CM

## 2023-09-18 DIAGNOSIS — E55.9 VITAMIN D DEFICIENCY: ICD-10-CM

## 2023-09-18 DIAGNOSIS — Z23 NEED FOR PROPHYLACTIC VACCINATION AGAINST STREPTOCOCCUS PNEUMONIAE (PNEUMOCOCCUS): ICD-10-CM

## 2023-09-18 DIAGNOSIS — F11.11 OPIOID ABUSE, IN REMISSION: ICD-10-CM

## 2023-09-18 DIAGNOSIS — Z11.1 TUBERCULOSIS SCREENING: ICD-10-CM

## 2023-09-18 DIAGNOSIS — R00.2 PALPITATION: ICD-10-CM

## 2023-09-18 DIAGNOSIS — F42.9 OBSESSIVE-COMPULSIVE DISORDER, UNSPECIFIED TYPE: ICD-10-CM

## 2023-09-18 DIAGNOSIS — E78.2 MIXED HYPERLIPIDEMIA: ICD-10-CM

## 2023-09-18 DIAGNOSIS — Z00.01 ENCOUNTER FOR GENERAL ADULT MEDICAL EXAMINATION WITH ABNORMAL FINDINGS: Primary | ICD-10-CM

## 2023-09-18 DIAGNOSIS — Z13.29 SCREENING FOR THYROID DISORDER: ICD-10-CM

## 2023-09-18 DIAGNOSIS — I10 PRIMARY HYPERTENSION: ICD-10-CM

## 2023-09-18 DIAGNOSIS — Z13.1 SCREENING FOR DIABETES MELLITUS: ICD-10-CM

## 2023-09-18 DIAGNOSIS — Z23 INFLUENZA VACCINE NEEDED: ICD-10-CM

## 2023-09-18 DIAGNOSIS — F17.210 CIGARETTE SMOKER: ICD-10-CM

## 2023-09-18 DIAGNOSIS — F41.1 GENERALIZED ANXIETY DISORDER: ICD-10-CM

## 2023-09-18 DIAGNOSIS — R73.03 PREDIABETES: ICD-10-CM

## 2023-09-18 DIAGNOSIS — K21.9 GERD WITHOUT ESOPHAGITIS: ICD-10-CM

## 2023-09-18 PROCEDURE — 99213 OFFICE O/P EST LOW 20 MIN: CPT | Performed by: INTERNAL MEDICINE

## 2023-09-18 PROCEDURE — 93000 ELECTROCARDIOGRAM COMPLETE: CPT | Performed by: INTERNAL MEDICINE

## 2023-09-18 PROCEDURE — 90677 PCV20 VACCINE IM: CPT | Performed by: INTERNAL MEDICINE

## 2023-09-18 PROCEDURE — 90472 IMMUNIZATION ADMIN EACH ADD: CPT | Performed by: INTERNAL MEDICINE

## 2023-09-18 PROCEDURE — 90471 IMMUNIZATION ADMIN: CPT | Performed by: INTERNAL MEDICINE

## 2023-09-18 PROCEDURE — 90686 IIV4 VACC NO PRSV 0.5 ML IM: CPT | Performed by: INTERNAL MEDICINE

## 2023-09-18 PROCEDURE — 90715 TDAP VACCINE 7 YRS/> IM: CPT | Performed by: INTERNAL MEDICINE

## 2023-09-18 PROCEDURE — 99396 PREV VISIT EST AGE 40-64: CPT | Performed by: INTERNAL MEDICINE

## 2023-09-18 NOTE — PROGRESS NOTES
"    Male Physical Note      Date: 2023   Patient Name: Iván Jeffers  : 1980   MRN: [id number]     Chief Complaint:    Chief Complaint   Patient presents with    Annual Exam       History of Present Illness: Iván Jeffers is a 43 y.o. male who is here today for their annual health maintenance and physical.  Patient indicates overall he feels well, with only a couple acute complaints, 1 of which is a \"skin tag\" lesion on the inner right-sided groin that is been present for at least 6 months and really is not tender.  He also notes sporadically having a sense of a skipping of his heart with no actual sustained tachycardia no chest pains dizziness or dyspnea associate with this.  The symptoms tend to occur randomly, not specifically triggered by activity, patient noting he does have a history of chronic anxiety which may be slightly associated with the symptoms though he is not certain whether they are causing symptoms or as a result.  He did have a formal cardiology evaluation for palpitations in  including a 2-week Holter monitor which revealed very rare eating PSVT and scattered PVCs but not felt to be clinically significant.  He also feels he is getting over a GI bug, having had some nausea and diarrhea from last week which is improving.  Family members have had similar symptoms.  We did specifically address his hypertension taking losartan/HCT with blood pressures averaging 130s over 80 range checked daily, no chest pains dizziness dyspnea or edema, having sporadic fleeting sense of palpitations as noted, history of anxiety/OCD for which she gets good relief taking Zoloft 100 mg at 1 tablet every morning and 0.5 tablets every afternoon and BuSpar 7.5 mg twice daily.  History of GERD taking Protonix 40 mg daily with no symptoms as long as he can recall, hyperlipidemia taking pravastatin 40 mg nightly.  He also has a history of substance abuse with opiates last utilized in 2009 with prior use for 9 " years, doing well taking Suboxone 2 tablets daily.  He is a light cigarette smoker intermittently smoking 1/4 pack/day interspersed with vaping.  Has never used Chantix in the past but has used nicotine replacement with limited success.  Also history of obesity just recently having started exercise, with portion control though still drinking some soda and occasional junk foods and fast foods.      Subjective      Review of Systems:   Review of Systems   Constitutional: Negative.    HENT: Negative.     Eyes: Negative.         Wears corrective lenses   Respiratory: Negative.  Negative for cough, chest tightness, shortness of breath and wheezing.    Cardiovascular:  Positive for palpitations. Negative for chest pain and leg swelling.        Sporadic sense of fleeting irregular heart rate   Gastrointestinal: Negative.  Negative for blood in stool, constipation, diarrhea, GERD and indigestion.        Previous GERD resolved   Genitourinary: Negative.  Negative for decreased libido, difficulty urinating, hematuria and erectile dysfunction.   Neurological: Negative.    Psychiatric/Behavioral:  Negative for dysphoric mood and depressed mood. The patient is nervous/anxious.         Rare anxiety overall good control with medication     Past Medical History, Social History, Family History and Care Team were all reviewed with patient and updated as appropriate.     Medications:     Current Outpatient Medications:     buprenorphine-naloxone (SUBOXONE) 8-2 MG film film, , Disp: , Rfl:     busPIRone (BUSPAR) 7.5 MG tablet, TAKE 1 TABLET BY MOUTH TWICE DAILY, Disp: 60 tablet, Rfl: 3    hydrOXYzine pamoate (VISTARIL) 25 MG capsule, Take 1 capsule by mouth 3 (Three) Times a Day As Needed for Anxiety., Disp: 30 capsule, Rfl: 3    losartan-hydrochlorothiazide (HYZAAR) 100-12.5 MG per tablet, TAKE 1 TABLET BY MOUTH DAILY, Disp: 30 tablet, Rfl: 3    pantoprazole (PROTONIX) 20 MG EC tablet, Take 1 tablet by mouth Daily., Disp: 90 tablet,  Rfl: 1    pravastatin (PRAVACHOL) 40 MG tablet, Take 1 tablet by mouth Every Night., Disp: 90 tablet, Rfl: 1    sertraline (ZOLOFT) 100 MG tablet, TAKE 1 TABLET BY MOUTH EVERY MORNING AND 1/2 TABLET EVERY EVENING, Disp: 135 tablet, Rfl: 1    Allergies:   No Known Allergies    Immunizations:  Health Maintenance Summary            Ordered - LIPID PANEL (Yearly) Ordered on 9/18/2023 09/16/2022  Lipid Panel              INFLUENZA VACCINE (Yearly - October to March) Next due on 10/1/2023      09/18/2023  Imm Admin: Fluzone (or Fluarix & Flulaval for VFC) >6mos    09/09/2022  Imm Admin: Fluzone Quad >6mos (Multi-dose)    09/09/2022  Imm Admin: Influenza, Unspecified    10/18/2021  Imm Admin: Influenza TIV (IM)    10/21/2020  Imm Admin: Flu Vaccine Quad PF >36MO    Only the first 5 history entries have been loaded, but more history exists.              ANNUAL PHYSICAL (Yearly) Next due on 9/18/2024 09/18/2023  Done    05/03/2021  Done              BMI FOLLOWUP (Yearly) Next due on 9/18/2024 09/18/2023  Registry Metric: Date of last BMI follow-up    09/09/2022  [employer]: WORKFLOW - QUALITY MEASUREMENT - BMI FOLLOW UP CARE PLAN DOCUMENTED              TDAP/TD VACCINES (3 - Td or Tdap) Next due on 9/18/2033 09/18/2023  Imm Admin: Tdap    08/15/1997  Imm Admin: Td (TDVAX)              HEPATITIS C SCREENING  Completed      09/16/2022  Hep C Virus Ab component of Hepatitis C Antibody              COVID-19 Vaccine (Series Information) Completed      11/23/2022  Imm Admin: COVID-19 (MODERNA) BIVALENT 12+YRS    12/13/2021  Imm Admin: COVID-19 (PFIZER) Purple Cap Monovalent    04/06/2021  Imm Admin: COVID-19 (PFIZER) Purple Cap Monovalent    03/09/2021  Imm Admin: COVID-19 (PFIZER) Purple Cap Monovalent              Pneumococcal Vaccine 0-64 (Series Information) Completed      09/18/2023  Imm Admin: Pneumococcal Conjugate 20-Valent (PCV20)                    Orders Placed This Encounter   Procedures    Tdap  Vaccine Greater Than or Equal To 6yo IM    Pneumococcal Conjugate Vaccine 20-Valent All    Fluzone (or Fluarix & Flulaval for VFC) >6mos       Colorectal Screening:   N/A  Last Completed Colonoscopy       This patient has no relevant Health Maintenance data.          CT for Smoker (Age 50-80, 20pk yr within last 15 years): N/A  Bone Density/DEXA (high risk): N/A  Hep C (Age 18-79 once): N/A  HIV (Age 15-65 once) : N/A  PSA (Over age 50, C Level Recommendation): N/A  US Aorta (For male smokers, age 65): N/A  A1c:   Hemoglobin A1C   Date Value Ref Range Status   09/16/2022 5.9 (H) 4.8 - 5.6 % Final     Comment:              Prediabetes: 5.7 - 6.4           Diabetes: >6.4           Glycemic control for adults with diabetes: <7.0       Lipid panel: No results found for: LABLIPI    The 10-year ASCVD risk score (Jadon CARPENTER, et al., 2019) is: 5.4%    Values used to calculate the score:      Age: 43 years      Sex: Male      Is Non- : No      Diabetic: No      Tobacco smoker: Yes      Systolic Blood Pressure: 142 mmHg      Is BP treated: Yes      HDL Cholesterol: 35 mg/dL      Total Cholesterol: 145 mg/dL    Dermatology: N/A  Ophthalmologist: Regular optometry evaluations  Dentist: Regular dental checks    Tobacco Use: High Risk    Smoking Tobacco Use: Light Smoker    Smokeless Tobacco Use: Never    Passive Exposure: Not on file       Social History     Substance and Sexual Activity   Alcohol Use Never        Social History     Substance and Sexual Activity   Drug Use Not Currently    Comment: mathadone         Diet/Physical activity: Recent initiation of exercise, still suboptimal diet, some portion control    Sexual health: No concerns, contraception used    PHQ-2 Depression Screening  PHQ-9 Total Score: 0      Smoking Cessation:   3-10 mintues spent counseling Will try to cut down     Objective     Physical Exam:  Vital Signs:   Vitals:    09/18/23 0949   BP: 142/80   BP Location: Left arm  "  Patient Position: Sitting   Cuff Size: Large Adult   Pulse: 84   Temp: 97.9 °F (36.6 °C)   TempSrc: Temporal   SpO2: 98%   Weight: 128 kg (282 lb 12.8 oz)   Height: 182.2 cm (71.75\")     Body mass index is 38.62 kg/m².     Physical Exam  Vitals and nursing note reviewed.   Constitutional:       Appearance: Normal appearance. He is obese.      Comments: Pleasant, healthy, no acute distress, alert and oriented, fluent speech, BMI 38.6   HENT:      Head: Normocephalic and atraumatic.      Right Ear: Tympanic membrane, ear canal and external ear normal.      Left Ear: Tympanic membrane, ear canal and external ear normal.      Nose: Nose normal.      Mouth/Throat:      Mouth: Mucous membranes are moist.      Pharynx: Oropharynx is clear.      Comments: Upper denture plate removed with no underlying mucosal abnormalities, residual mandibular dentition without abnormalities  Eyes:      Extraocular Movements: Extraocular movements intact.      Conjunctiva/sclera: Conjunctivae normal.      Pupils: Pupils are equal, round, and reactive to light.      Comments: Wearing eyeglasses   Neck:      Vascular: No carotid bruit.      Comments: No cervical adenopathy masses or tenderness, full range of motion, no periclavicular or axillary or inguinal adenopathy  Cardiovascular:      Rate and Rhythm: Normal rate and regular rhythm.      Pulses: Normal pulses.      Heart sounds: Normal heart sounds. No murmur heard.    No friction rub. No gallop.      Comments: 2+ carotids without bruits, 2+ radial pulses, 2+ femoral pulses without bruits, 2+ bipedal pulses with good perfusion and no edema  Pulmonary:      Effort: Pulmonary effort is normal.      Breath sounds: Normal breath sounds.      Comments: No cough wheeze or dyspnea  Abdominal:      General: Abdomen is flat. Bowel sounds are normal.      Palpations: Abdomen is soft. There is no mass.      Tenderness: There is no abdominal tenderness. There is no guarding or rebound. "   Genitourinary:     Comments: Normal circumcised male with testes descended bilaterally without nodules or tenderness, no inguinal herniation or adenopathy, no rash, noting he does have a 1/2 cm seborrheic keratosis on the inner aspect of his thigh approximately, no other abnormalities noted, rectal exam deferred  Musculoskeletal:         General: No swelling, tenderness or deformity. Normal range of motion.      Cervical back: Normal range of motion and neck supple. No rigidity or tenderness.      Right lower leg: No edema.      Left lower leg: No edema.   Lymphadenopathy:      Cervical: No cervical adenopathy.   Skin:     General: Skin is warm and dry.      Capillary Refill: Capillary refill takes less than 2 seconds.      Findings: Lesion present. No rash.      Comments: Seborrheic keratosis right proximal medial thigh, benign in appearance   Neurological:      General: No focal deficit present.      Mental Status: He is alert and oriented to person, place, and time. Mental status is at baseline.      Comments: Bipedal exam with 2+ DP and 2+ PT pulses bilaterally with normal sensation of both feet to fine touch vibration and pinprick, no edema, motor exam normal, no skin breakdown   Psychiatric:         Mood and Affect: Mood normal.         Behavior: Behavior normal.         Thought Content: Thought content normal.         Judgment: Judgment normal.        POCT Results (if applicable):   Results for orders placed or performed in visit on 09/16/22   Human Immunodeficiency Virus 1 & 2 (HIV-1/HIV-2), Qualitative, RNA    Specimen: Arm, Left; Blood    Blood  Release to korey   Result Value Ref Range    HIV 1 RNA Qualitative Non Reactive Non Reactive    HIV-2 RNA QUALITATIVE Non Reactive Non Reactive   Hepatitis C Antibody    Specimen: Arm, Left; Blood    Blood  Release to korey   Result Value Ref Range    Hep C Virus Ab <0.1 0.0 - 0.9 s/co ratio   proBNP    Specimen: Arm, Left; Blood    Blood  Release to korey   Result  Value Ref Range    proBNP 16 0 - 86 pg/mL   Lipid Panel    Specimen: Arm, Left; Blood    Blood  Release to korey   Result Value Ref Range    Total Cholesterol 145 100 - 199 mg/dL    Triglycerides 202 (H) 0 - 149 mg/dL    HDL Cholesterol 35 (L) >39 mg/dL    VLDL Cholesterol [place] 34 5 - 40 mg/dL    LDL Chol Calc (NIH) 76 0 - 99 mg/dL   Hemoglobin A1c    Specimen: Arm, Left; Blood    Blood  Release to korey   Result Value Ref Range    Hemoglobin A1C 5.9 (H) 4.8 - 5.6 %   Comprehensive Metabolic Panel    Specimen: Arm, Left; Blood    Blood  Release to korey   Result Value Ref Range    Glucose CANCELED mg/dL    BUN 18 6 - 24 mg/dL    Creatinine 0.87 0.76 - 1.27 mg/dL    EGFR Result 111 >59 mL/min/1.73    BUN/Creatinine Ratio 21 (H) 9 - 20    Sodium 142 134 - 144 mmol/L    Potassium CANCELED mmol/L    Chloride 101 96 - 106 mmol/L    Total CO2 22 20 - 29 mmol/L    Calcium 9.2 8.7 - 10.2 mg/dL    Total Protein 7.3 6.0 - 8.5 g/dL    Albumin 4.7 4.0 - 5.0 g/dL    Globulin 2.6 1.5 - 4.5 g/dL    A/G Ratio 1.8 1.2 - 2.2    Total Bilirubin <0.2 0.0 - 1.2 mg/dL    Alkaline Phosphatase 107 44 - 121 IU/L    AST (SGOT) 22 0 - 40 IU/L    ALT (SGPT) 30 0 - 44 IU/L   TSH Rfx On Abnormal To Free T4    Specimen: Arm, Left; Blood    Blood  Release to korey   Result Value Ref Range    TSH 3.320 0.450 - 4.500 uIU/mL   Urinalysis With Culture If Indicated -    Blood  Release to korey   Result Value Ref Range    Specific Gravity, UA 1.029 1.005 - 1.030    pH, UA 5.5 5.0 - 7.5    Color, UA Yellow Yellow    Appearance, UA Clear Clear    Leukocytes, UA Negative Negative    Protein Trace Negative/Trace    Glucose, UA Negative Negative    Ketones Trace (A) Negative    Blood, UA Negative Negative    Bilirubin, UA Negative Negative    Urobilinogen, UA 0.2 0.2 - 1.0 mg/dL    Nitrite, UA Negative Negative    Microscopic Examination Comment     Microscopic Examination See below:     Urinalysis Reflex Comment    Microscopic Examination -    Blood  Release to  korey   Result Value Ref Range    WBC, UA None seen 0 - 5 /hpf    RBC, UA None seen 0 - 2 /hpf    Epithelial Cells (non renal) 0-10 0 - 10 /hpf    Casts None seen None seen /lpf    Bacteria, UA None seen None seen/Few   Written Authorization    Blood  Release to korey   Result Value Ref Range    Written Authorization Comment          ECG 12 Lead    Date/Time: 9/18/2023 10:41 AM  Performed by: Kamran Vázquez MD  Authorized by: Kamran Vázquez MD   Comparison: compared with previous ECG from 10/18/2021  Similar to previous ECG  Comparison to previous ECG: Normal sinus rhythm rate 76 with no acute or chronic abnormalities noted, no change versus prior tracing        Assessment / Plan      Assessment/Plan:   Diagnoses and all orders for this visit:    1. Encounter for general adult medical examination with abnormal findings (Primary)  -     TSH Rfx On Abnormal To Free T4; Future  -     Comprehensive Metabolic Panel; Future  -     Lipid Panel; Future  -     Hemoglobin A1c; Future  -     Vitamin D,25-Hydroxy; Future  -     Urinalysis With Culture If Indicated -; Future  -     Cancel: QuantiFERON TB Gold; Future  -     ECG 12 Lead  -     Tdap Vaccine Greater Than or Equal To 6yo IM  -     Pneumococcal Conjugate Vaccine 20-Valent All  -     Fluzone (or Fluarix & Flulaval for VFC) >6mos  -     QuantiFERON TB Gold; Future  -     QuantiFERON TB Gold  -     Urinalysis With Culture If Indicated - Urine, Clean Catch  -     Vitamin D,25-Hydroxy  -     Hemoglobin A1c  -     Lipid Panel  -     Comprehensive Metabolic Panel  -     TSH Rfx On Abnormal To Free T4  Health maintenance to be updated including Tdap, flu vaccine, Prevnar 20, and recommended obtaining COVID-19 booster to the office, also update pertinent labs.  2. Primary hypertension  -     Comprehensive Metabolic Panel; Future  -     Urinalysis With Culture If Indicated -; Future  -     ECG 12 Lead  -     Urinalysis With Culture If Indicated - Urine, Clean Catch  -      Comprehensive Metabolic Panel  Stage I elevation acutely, chronically relates good control taking losartan//12.5 mg daily.  Continue current regimen with monitoring, pursuing healthy lifestyle with diet and exercise  3. Prediabetes  -     Hemoglobin A1c; Future  Hemoglobin A1c of 5.9% 1 year prior.  Update testing, pursuing healthy lifestyle.  4. Mixed hyperlipidemia  -     Lipid Panel; Future  -     Lipid Panel  Taking pravastatin 40 mg nightly.  Update profile.  5. Vitamin D deficiency  -     Vitamin D,25-Hydroxy; Future  -     Vitamin D,25-Hydroxy    6. Morbid obesity  Discussed healthy lifestyle with diet and exercise.  If not beneficial we will then discuss whether or not would qualify for GLP-1 agonists through his insurance.  7. Palpitation  -     ECG 12 Lead  Intermittent longstanding history, negative work-up in 2021 with 14-day Holter monitor indicating sporadic PVCs and sporadic fleeting PSVT that was not recommended to be treated by cardiology.  EKG today unremarkable.  8. GERD without esophagitis  Asymptomatic for an extended period time taking Protonix 40 mg daily.  I suggested a trial of discontinuing medication, and if gets recurrent symptoms consider alternating every other day to use the least amount that will control his symptoms.  9. Generalized anxiety disorder  Historically doing well taking sertraline 100 mg tablets, at 1 in the morning and 0.5 tablets in the evening along with hydroxyzine 25 mg 3 times daily as needed, and BuSpar 7.5 mg twice daily.  10. Obsessive-compulsive disorder, unspecified type  Refer to above  11. Opioid abuse, in remission  Doing well on Suboxone 2 tablets daily, last usage 1/2009 with previous 9-year use.  12. Cigarette smoker  Cigarette smoker or vaping use, no more than 1/4 pack/day.  Discussed treatment options including Chantix versus nicotine replacement.  He elects for the latter and I discussed use of Nicorette gum versus nicotine patches with proper  dosing technique.  13. Tuberculosis screening  -     Cancel: QuantiFERON TB Gold; Future  -     QuantiFERON TB Gold; Future  -     QuantiFERON TB Gold  Obtain TB screening per school requirement  14. Screening for diabetes mellitus  -     Hemoglobin A1c    15. Screening for thyroid disorder  -     TSH Rfx On Abnormal To Free T4; Future  -     TSH Rfx On Abnormal To Free T4    16. Need for Tdap vaccination  -     Tdap Vaccine Greater Than or Equal To 6yo IM    17. Need for prophylactic vaccination against Streptococcus pneumoniae (pneumococcus)  -     Pneumococcal Conjugate Vaccine 20-Valent All    18. Influenza vaccine needed  -     Fluzone (or Fluarix & Flulaval for VFC) >6mos    Recommend obtaining COVID-19 booster outside the office.    Healthcare Maintenance:  Counseling provided based on age appropriate USPSTF guidelines.  Class 2 Severe Obesity (BMI >=35 and <=39.9). Obesity-related health conditions include the following: hypertension, dyslipidemias, GERD, and prediabetes . Obesity is unchanged. BMI is is above average; BMI management plan is completed. We discussed portion control and increasing exercise.    Iván Jeffers voices understanding and acceptance of this advice and will call back with any further questions or concerns. AVS with preventive healthcare tips printed for patient.     Vaccine Counseling:  “Discussed risks/benefits to vaccination, reviewed components of the vaccine, discussed VIS, discussed informed consent, informed consent obtained. Patient/Parent was allowed to accept or refuse vaccine. Questions answered to satisfactory state of patient/Parent. We reviewed typical age appropriate and seasonally appropriate vaccinations. Reviewed immunization history and updated state vaccination form as needed. Patient was counseled on Influenza  Prevnar 20  Tdap    Follow Up:   Return in about 1 year (around 9/18/2024) for Annual physical.    At [facility], we believe that sharing information  builds trust and better relationships. You are receiving this note because you recently visited [facility]. It is possible you will see health information before a provider has talked with you about it. This kind of information can be easy to misunderstand. To help you fully understand what it means for your health, we urge you to discuss this note with your provider.    Kamran Vázquez MD  [facility]

## 2023-09-18 NOTE — PROGRESS NOTES
Venipuncture Blood Specimen Collection  Venipuncture performed in right arm by May Alva MA with good hemostasis. Patient tolerated the procedure well without complications.   09/18/23   May Alva MA

## 2023-09-19 LAB
APPEARANCE UR: CLEAR
BACTERIA #/AREA URNS HPF: NORMAL /[HPF]
BILIRUB UR QL STRIP: NEGATIVE
CASTS URNS QL MICRO: NORMAL /LPF
COLOR UR: YELLOW
EPI CELLS #/AREA URNS HPF: NORMAL /HPF (ref 0–10)
GLUCOSE UR QL STRIP: NEGATIVE
HGB UR QL STRIP: NEGATIVE
KETONES UR QL STRIP: NEGATIVE
LEUKOCYTE ESTERASE UR QL STRIP: NEGATIVE
MICRO URNS: NORMAL
MICRO URNS: NORMAL
NITRITE UR QL STRIP: NEGATIVE
PH UR STRIP: 5.5 [PH] (ref 5–7.5)
PROT UR QL STRIP: NEGATIVE
RBC #/AREA URNS HPF: NORMAL /HPF (ref 0–2)
SP GR UR STRIP: 1.03 (ref 1–1.03)
URINALYSIS REFLEX: NORMAL
UROBILINOGEN UR STRIP-MCNC: 1 MG/DL (ref 0.2–1)
WBC #/AREA URNS HPF: NORMAL /HPF (ref 0–5)

## 2023-09-20 LAB
25(OH)D3+25(OH)D2 SERPL-MCNC: 30.7 NG/ML (ref 30–100)
ALBUMIN SERPL-MCNC: 4.9 G/DL (ref 4.1–5.1)
ALBUMIN/GLOB SERPL: 1.9 {RATIO} (ref 1.2–2.2)
ALP SERPL-CCNC: 97 IU/L (ref 44–121)
ALT SERPL-CCNC: 37 IU/L (ref 0–44)
AST SERPL-CCNC: 30 IU/L (ref 0–40)
BILIRUB SERPL-MCNC: 0.3 MG/DL (ref 0–1.2)
BUN SERPL-MCNC: 15 MG/DL (ref 6–24)
BUN/CREAT SERPL: 16 (ref 9–20)
CALCIUM SERPL-MCNC: 9.4 MG/DL (ref 8.7–10.2)
CHLORIDE SERPL-SCNC: 100 MMOL/L (ref 96–106)
CHOLEST SERPL-MCNC: 131 MG/DL (ref 100–199)
CO2 SERPL-SCNC: 27 MMOL/L (ref 20–29)
CREAT SERPL-MCNC: 0.91 MG/DL (ref 0.76–1.27)
EGFRCR SERPLBLD CKD-EPI 2021: 108 ML/MIN/1.73
GLOBULIN SER CALC-MCNC: 2.6 G/DL (ref 1.5–4.5)
GLUCOSE SERPL-MCNC: 112 MG/DL (ref 70–99)
HBA1C MFR BLD: 5.7 % (ref 4.8–5.6)
HDLC SERPL-MCNC: 29 MG/DL
LDLC SERPL CALC-MCNC: 76 MG/DL (ref 0–99)
POTASSIUM SERPL-SCNC: 3.9 MMOL/L (ref 3.5–5.2)
PROT SERPL-MCNC: 7.5 G/DL (ref 6–8.5)
SODIUM SERPL-SCNC: 140 MMOL/L (ref 134–144)
SPECIMEN STATUS: NORMAL
TRIGL SERPL-MCNC: 144 MG/DL (ref 0–149)
TSH SERPL DL<=0.005 MIU/L-ACNC: 2.44 UIU/ML (ref 0.45–4.5)
VLDLC SERPL CALC-MCNC: 26 MG/DL (ref 5–40)

## 2023-09-22 RX ORDER — BUSPIRONE HYDROCHLORIDE 7.5 MG/1
TABLET ORAL
Qty: 60 TABLET | Refills: 3 | Status: SHIPPED | OUTPATIENT
Start: 2023-09-22

## 2023-09-25 ENCOUNTER — TELEPHONE (OUTPATIENT)
Dept: FAMILY MEDICINE CLINIC | Facility: CLINIC | Age: 43
End: 2023-09-25
Payer: COMMERCIAL

## 2023-09-25 NOTE — TELEPHONE ENCOUNTER
Review of testing from 9/18/2023 as follows:    Urinalysis normal  Vitamin D 30.7, normal  Hemoglobin A1c 5.7% improved from 5.9% 1 year prior  Total cholesterol 131, triglyceride 144, HDL low at 29, LDL 76  CMP significant only for glucose of 112, specifically creatinine 0.91 and   TSH 2.44, normal  Quantiferon TB Gold negative    Assessment/plan:  1.  Prediabetes, improved.  Continue healthy lifestyle with diet and exercise  2.  Dyslipidemia, satisfactory control other than low HDL.  Continue pravastatin 40 mg nightly.  3.  Remainder of laboratory testing satisfactory.  4.  Contact patient once pending test result is finalized    Addendum 9/27/2023:  Message left on patient's answer machine after Quantiferon Gold TB Gold testing finally completed and forwarded today, with me indicating to him that all his test results were satisfactory.  Advised to call me for any questions regarding test results, and also advised that he may stop by to get a copy of his test results at his convenience.

## 2023-09-26 LAB
25(OH)D3+25(OH)D2 SERPL-MCNC: 30.7 NG/ML (ref 30–100)
ALBUMIN SERPL-MCNC: 4.9 G/DL (ref 4.1–5.1)
ALBUMIN/GLOB SERPL: 1.9 {RATIO} (ref 1.2–2.2)
ALP SERPL-CCNC: 97 IU/L (ref 44–121)
ALT SERPL-CCNC: 37 IU/L (ref 0–44)
AST SERPL-CCNC: 30 IU/L (ref 0–40)
BILIRUB SERPL-MCNC: 0.3 MG/DL (ref 0–1.2)
BUN SERPL-MCNC: 15 MG/DL (ref 6–24)
BUN/CREAT SERPL: 16 (ref 9–20)
CALCIUM SERPL-MCNC: 9.4 MG/DL (ref 8.7–10.2)
CHLORIDE SERPL-SCNC: 100 MMOL/L (ref 96–106)
CHOLEST SERPL-MCNC: 131 MG/DL (ref 100–199)
CO2 SERPL-SCNC: 27 MMOL/L (ref 20–29)
CREAT SERPL-MCNC: 0.91 MG/DL (ref 0.76–1.27)
EGFRCR SERPLBLD CKD-EPI 2021: 108 ML/MIN/1.73
GAMMA INTERFERON BACKGROUND BLD IA-ACNC: 0.06 IU/ML
GLOBULIN SER CALC-MCNC: 2.6 G/DL (ref 1.5–4.5)
GLUCOSE SERPL-MCNC: 112 MG/DL (ref 70–99)
HBA1C MFR BLD: 5.7 % (ref 4.8–5.6)
HDLC SERPL-MCNC: 29 MG/DL
LDLC SERPL CALC-MCNC: 76 MG/DL (ref 0–99)
M TB IFN-G BLD-IMP: NEGATIVE
M TB IFN-G CD4+ T-CELLS BLD-ACNC: 0.09 IU/ML
M TBIFN-G CD4+ CD8+T-CELLS BLD-ACNC: 0.1 IU/ML
MITOGEN IGNF BLD-ACNC: >10 IU/ML
POTASSIUM SERPL-SCNC: 3.9 MMOL/L (ref 3.5–5.2)
PROT SERPL-MCNC: 7.5 G/DL (ref 6–8.5)
QUANTIFERON INCUBATION: NORMAL
SERVICE CMNT-IMP: NORMAL
SODIUM SERPL-SCNC: 140 MMOL/L (ref 134–144)
TRIGL SERPL-MCNC: 144 MG/DL (ref 0–149)
TSH SERPL DL<=0.005 MIU/L-ACNC: 2.44 UIU/ML (ref 0.45–4.5)
VLDLC SERPL CALC-MCNC: 26 MG/DL (ref 5–40)

## 2023-11-10 RX ORDER — SERTRALINE HYDROCHLORIDE 100 MG/1
TABLET, FILM COATED ORAL
Qty: 135 TABLET | Refills: 1 | Status: SHIPPED | OUTPATIENT
Start: 2023-11-10

## 2023-12-13 RX ORDER — LOSARTAN POTASSIUM AND HYDROCHLOROTHIAZIDE 12.5; 1 MG/1; MG/1
1 TABLET ORAL DAILY
Qty: 30 TABLET | Refills: 3 | Status: SHIPPED | OUTPATIENT
Start: 2023-12-13

## 2024-04-22 RX ORDER — LOSARTAN POTASSIUM AND HYDROCHLOROTHIAZIDE 12.5; 1 MG/1; MG/1
1 TABLET ORAL DAILY
Qty: 30 TABLET | Refills: 3 | Status: SHIPPED | OUTPATIENT
Start: 2024-04-22

## 2024-05-14 RX ORDER — SERTRALINE HYDROCHLORIDE 100 MG/1
TABLET, FILM COATED ORAL
Qty: 135 TABLET | Refills: 1 | Status: SHIPPED | OUTPATIENT
Start: 2024-05-14

## 2024-05-14 RX ORDER — PRAVASTATIN SODIUM 40 MG
40 TABLET ORAL NIGHTLY
Qty: 90 TABLET | Refills: 1 | Status: SHIPPED | OUTPATIENT
Start: 2024-05-14

## 2024-05-15 RX ORDER — SERTRALINE HYDROCHLORIDE 100 MG/1
TABLET, FILM COATED ORAL
Qty: 135 TABLET | Refills: 1 | OUTPATIENT
Start: 2024-05-15

## 2024-08-15 ENCOUNTER — TELEPHONE (OUTPATIENT)
Dept: FAMILY MEDICINE CLINIC | Facility: CLINIC | Age: 44
End: 2024-08-15
Payer: COMMERCIAL

## 2024-08-15 NOTE — TELEPHONE ENCOUNTER
Caller: Iván Jeffers    Relationship to patient: Self    Best call back number: [phone]    Chief complaint: HEART PALPITATIONS    Patient directed to call 911 or go to their nearest emergency room.     Patient verbalized understanding: [x] Yes  [] No  If no, why?    Additional notes:  PATIENT WILL FOLLOW UP WITH   DR. ANA FITZGERALD

## 2024-08-15 NOTE — TELEPHONE ENCOUNTER
I have called to check on him and to make sure he went to the ER. There was no answer but I left him a vm letting him know I was checking on him. I have asked that he call if he needs anything. TF

## 2024-08-21 ENCOUNTER — OFFICE VISIT (OUTPATIENT)
Dept: FAMILY MEDICINE CLINIC | Facility: CLINIC | Age: 44
End: 2024-08-21
Payer: COMMERCIAL

## 2024-08-21 VITALS
SYSTOLIC BLOOD PRESSURE: 143 MMHG | BODY MASS INDEX: 38.55 KG/M2 | DIASTOLIC BLOOD PRESSURE: 86 MMHG | HEIGHT: 72 IN | TEMPERATURE: 97.8 F | OXYGEN SATURATION: 99 % | WEIGHT: 284.6 LBS | HEART RATE: 91 BPM

## 2024-08-21 DIAGNOSIS — I10 PRIMARY HYPERTENSION: ICD-10-CM

## 2024-08-21 DIAGNOSIS — F17.290 VAPING NICOTINE DEPENDENCE, TOBACCO PRODUCT: ICD-10-CM

## 2024-08-21 DIAGNOSIS — F41.1 GENERALIZED ANXIETY DISORDER: Primary | ICD-10-CM

## 2024-08-21 DIAGNOSIS — I49.1 PAC (PREMATURE ATRIAL CONTRACTION): ICD-10-CM

## 2024-08-21 DIAGNOSIS — F11.11 OPIOID ABUSE, IN REMISSION: ICD-10-CM

## 2024-08-21 PROCEDURE — 99214 OFFICE O/P EST MOD 30 MIN: CPT | Performed by: INTERNAL MEDICINE

## 2024-08-21 RX ORDER — LORAZEPAM 1 MG/1
1 TABLET ORAL 3 TIMES DAILY
COMMUNITY
Start: 2024-08-19

## 2024-08-21 RX ORDER — METOPROLOL SUCCINATE 50 MG/1
50 TABLET, EXTENDED RELEASE ORAL DAILY
Qty: 30 TABLET | Refills: 1 | Status: SHIPPED | OUTPATIENT
Start: 2024-08-21 | End: 2024-08-21

## 2024-08-21 RX ORDER — METOPROLOL SUCCINATE 50 MG/1
50 TABLET, EXTENDED RELEASE ORAL DAILY
Qty: 30 TABLET | Refills: 1 | Status: SHIPPED | OUTPATIENT
Start: 2024-08-21

## 2024-08-21 RX ORDER — SERTRALINE HYDROCHLORIDE 100 MG/1
100 TABLET, FILM COATED ORAL DAILY
COMMUNITY

## 2024-08-21 NOTE — ASSESSMENT & PLAN NOTE
Chronic problem with generalized anxiety disorder, historically having had good control taking sertraline 100 mg daily.  9 days ago had significant exacerbation of his symptoms in conjunction with more awareness of his intermittent palpitations.  EKG at the ER 4 days ago on 8/17/2024 documented frequent PACs.  Patient given IV lorazepam in the ER and a prescription for lorazepam 1 mg every 8 hours. Advised patient I do not want him taking lorazepam on a routine basis given addictive potential as well as his history of substance abuse.  Plan increase sertraline 100 mg up to 1.5 tablets or 150 mg daily, adding metoprolol XL 50 mg daily for purposes of suppressing his palpitations/PACs as well as augmenting his control of anxiety.  Use previous prescription hydroxyzine 25 mg taking 1 to 2 tablets every 6 hours as needed acute anxiety.  Reassess clinically in the next month.

## 2024-08-21 NOTE — ASSESSMENT & PLAN NOTE
Ex-cigarette smoker now vaping nicotine product.  Patient aware of the health risks and will attempt to discontinue.  Encouraged to do so.

## 2024-08-21 NOTE — ASSESSMENT & PLAN NOTE
Very satisfactory blood pressure control acutely as well as chronically by history taking losartan/HCTZ 100/12.5 mg daily.  Continue current regimen, monitoring closely especially given the addition of metoprolol XL 50 mg daily for purposes of suppressing his PACs as well as benefiting his anxiety.

## 2024-08-21 NOTE — ASSESSMENT & PLAN NOTE
Reports good control of his prior opiate abuse currently in remission taking Suboxone.  UDS in the ER 4 days ago appropriately detected Suboxone only.

## 2024-08-21 NOTE — ASSESSMENT & PLAN NOTE
History of well-documented palpitations with Holter monitor 9/2021 revealing predominant sinus rhythm with less than 0.01% burden of PVCs and less than 0.01% burden of PSVT, historically noting occasional palpitation fleetingly for days at a time then may not occur for weeks to a couple months.  Significant exacerbation of the PACs 9 days ago.  EKG from ER 4 days ago on 8/17/2024 revealing frequent PACs with no abnormalities noted otherwise.  PACs seem to be a trigger of his chronic anxiety.  Will initiate metoprolol XL 50 mg daily for purposes of PAC/palpitation suppression as well as to secondarily benefit his anxiety.  Monitor blood pressure and pulse rate closely per parameters discussed.

## 2024-08-21 NOTE — PROGRESS NOTES
"    Follow Up Office Visit      Date: 2024   Patient Name: Iván Jeffers  : 1980   MRN: [id number]     Chief Complaint:    Chief Complaint   Patient presents with    ER follow up       History of Present Illness: Iván Jeffers is a 44 y.o. male who is here today for an ER visit at [facility] on 2024.  Patient has a history of longstanding anxiety disorder for which he takes sertraline 100 mg daily.  He also has a history of well-documented PACs, having had a cardiac workup in  including a Holter monitor revealing frequent PACs.  He notes 5 days prior to ER visit having increasing restlessness with anxiousness associated with increasing sense of palpitations but no chest pain or dyspnea,, feeling like he had to constantly pace, the anxiety described as coming in \"waves\", intermittently feeling lightheaded, intermittent headache, tingling in his fingers, decreased sleep pattern.  Symptoms would be better if he lay on his stomach and worse if he would experience heat.  Associate with this he was become more cognizant of skipping his/palpitations in his chest which seem to exacerbate his symptoms.  New stressors included recent family ember dying, as well as perceived stress from his children entering the school year this .  Ultimately went to the ER 4 days ago, 5 days after onset of symptoms, EKG revealing frequent PACs which have been previously noted, lab testing including CBC CMP and urinalysis unremarkable other than potassium of 3.4 for which she received single dose of potassium chloride 20 mEq, UDS appropriately positive for Suboxone given previous opiate abuse now on Suboxone maintenance.  He was given Ativan 1 mg IV in the ER feeling much better, discharged home in improved condition with prescription of Ativan 1 mg to take every 8 hours as needed, #10.  Note since that discharge that he still has anxiousness but this seems to be alleviated by the event.  Not frankly " "depressed.  Also has a history of hypertension taking losartan/HCTZ 100/12.5 mg daily with blood pressures generally averaging in the mid upper 130s over low 80s..  Previous cigarette smoker now vaping nicotine product.  Had been drinking alcohol little bit to excess but he now has discontinued completely.  ER record from [facility] 8/17/2024 was reviewed by me today in detail.    Subjective      Review of Systems:   Review of Systems    I have reviewed the patients family history, social history, past medical history, past surgical history and have updated it as appropriate.     Medications:     Current Outpatient Medications:     buprenorphine-naloxone (SUBOXONE) 8-2 MG film film, , Disp: , Rfl:     hydrOXYzine pamoate (VISTARIL) 25 MG capsule, Take 1 capsule by mouth 3 (Three) Times a Day As Needed for Anxiety., Disp: 30 capsule, Rfl: 3    LORazepam (ATIVAN) 1 MG tablet, Take 1 tablet by mouth 3 times a day., Disp: , Rfl:     losartan-hydrochlorothiazide (HYZAAR) 100-12.5 MG per tablet, TAKE 1 TABLET BY MOUTH DAILY, Disp: 30 tablet, Rfl: 3    pravastatin (PRAVACHOL) 40 MG tablet, TAKE 1 TABLET BY MOUTH EVERY NIGHT, Disp: 90 tablet, Rfl: 1    metoprolol succinate XL (Toprol XL) 50 MG 24 hr tablet, Take 1 tablet by mouth Daily., Disp: 30 tablet, Rfl: 1    sertraline (ZOLOFT) 100 MG tablet, Take 1 tablet by mouth Daily. 1.5 tablets daily    Indications: Generalized Anxiety Disorder, PACs, Disp: , Rfl:     Allergies:   No Known Allergies    Objective     Physical Exam: Please see above  Vital Signs:   Vitals:    08/21/24 1209   BP: 143/86   BP Location: Left arm   Patient Position: Sitting   Cuff Size: Adult   Pulse: 91   Temp: 97.8 °F (36.6 °C)   TempSrc: Temporal   SpO2: 99%   Weight: 129 kg (284 lb 9.6 oz)   Height: 182.2 cm (71.75\")     Body mass index is 38.87 kg/m².          Physical Exam  Constitutional:       General: He is not in acute distress.     Appearance: Normal appearance. He is obese. " He is not ill-appearing.      Comments: Pleasant, healthy, affect appropriate not appearing to be anxious at this time, BMI 38.8 reflecting a 2 pound weight gain in the last 11 months   Cardiovascular:      Rate and Rhythm: Normal rate and regular rhythm.      Heart sounds: Normal heart sounds. No murmur heard.     No friction rub. No gallop.   Pulmonary:      Effort: Pulmonary effort is normal. No respiratory distress.      Breath sounds: Normal breath sounds.   Musculoskeletal:      Cervical back: No rigidity or tenderness.   Lymphadenopathy:      Cervical: No cervical adenopathy.   Neurological:      General: No focal deficit present.      Mental Status: He is alert and oriented to person, place, and time. Mental status is at baseline.   Psychiatric:         Mood and Affect: Mood normal.         Behavior: Behavior normal.         Thought Content: Thought content normal.         Judgment: Judgment normal.         Procedures    Results:   Labs:   Hemoglobin A1C   Date Value Ref Range Status   09/18/2023 5.7 (H) 4.8 - 5.6 % Final     Comment:              Prediabetes: 5.7 - 6.4           Diabetes: >6.4           Glycemic control for adults with diabetes: <7.0       TSH   Date Value Ref Range Status   09/18/2023 2.440 0.450 - 4.500 uIU/mL Final   04/22/2021 2.740 0.270 - 4.200 uIU/mL Final        POCT Results (if applicable):   Results for orders placed or performed in visit on 09/18/23   QuantiFERON TB Gold    Specimen: Arm, Right; Blood    Blood  Release to korey   Result Value Ref Range    QuantiFERON Incubation Incubation performed.     QuantiFERON Criteria Comment     QUANTIFERON TB1 AG VALUE 0.09 IU/mL    QUANTIFERON TB2 AG VALUE 0.10 IU/mL    QuantiFERON Nil Value 0.06 IU/mL    QuantiFERON Mitogen Value >10.00 IU/mL    QUANTIFERON-TB GOLD PLUS Negative Negative   Urinalysis With Culture If Indicated - Urine, Clean Catch    Specimen: Urine, Clean Catch    Urine  Release to korey   Result Value Ref Range     Specific Gravity, UA 1.027 1.005 - 1.030    pH, UA 5.5 5.0 - 7.5    Color, UA Yellow Yellow    Appearance, UA Clear Clear    Leukocytes, UA Negative Negative    Protein Negative Negative/Trace    Glucose, UA Negative Negative    Ketones Negative Negative    Blood, UA Negative Negative    Bilirubin, UA Negative Negative    Urobilinogen, UA 1.0 0.2 - 1.0 mg/dL    Nitrite, UA Negative Negative    Microscopic Examination Comment     Microscopic Examination See below:     Urinalysis Reflex Comment    Vitamin D,25-Hydroxy    Specimen: Arm, Right; Blood    Blood  Release to korey   Result Value Ref Range    25 Hydroxy, Vitamin D 30.7 30.0 - 100.0 ng/mL   Hemoglobin A1c    Specimen: Arm, Right; Blood    Blood  Release to korey   Result Value Ref Range    Hemoglobin A1C 5.7 (H) 4.8 - 5.6 %   Lipid Panel    Specimen: Arm, Right; Blood    Blood  Release to korey   Result Value Ref Range    Total Cholesterol 131 100 - 199 mg/dL    Triglycerides 144 0 - 149 mg/dL    HDL Cholesterol 29 (L) >39 mg/dL    VLDL Cholesterol [place] 26 5 - 40 mg/dL    LDL Chol Calc (NIH) 76 0 - 99 mg/dL   Comprehensive Metabolic Panel    Specimen: Arm, Right; Blood    Blood  Release to korey   Result Value Ref Range    Glucose 112 (H) 70 - 99 mg/dL    BUN 15 6 - 24 mg/dL    Creatinine 0.91 0.76 - 1.27 mg/dL    EGFR Result 108 >59 mL/min/1.73    BUN/Creatinine Ratio 16 9 - 20    Sodium 140 134 - 144 mmol/L    Potassium 3.9 3.5 - 5.2 mmol/L    Chloride 100 96 - 106 mmol/L    Total CO2 27 20 - 29 mmol/L    Calcium 9.4 8.7 - 10.2 mg/dL    Total Protein 7.5 6.0 - 8.5 g/dL    Albumin 4.9 4.1 - 5.1 g/dL    Globulin 2.6 1.5 - 4.5 g/dL    A/G Ratio 1.9 1.2 - 2.2    Total Bilirubin 0.3 0.0 - 1.2 mg/dL    Alkaline Phosphatase 97 44 - 121 IU/L    AST (SGOT) 30 0 - 40 IU/L    ALT (SGPT) 37 0 - 44 IU/L   TSH Rfx On Abnormal To Free T4    Specimen: Arm, Right; Blood    Blood  Release to korey   Result Value Ref Range    TSH 2.440 0.450 - 4.500 uIU/mL   Microscopic Examination  -    Urine  Release to korey   Result Value Ref Range    WBC, UA None seen 0 - 5 /hpf    RBC, UA None seen 0 - 2 /hpf    Epithelial Cells (non renal) 0-10 0 - 10 /hpf    Casts None seen None seen /lpf    Bacteria, UA None seen None seen/Few   Specimen Status Report    Blood  Release to korey   Result Value Ref Range    Specimen Status CANCELED        Imaging:   No valid procedures specified.     Review of test results as documented by ER physician from [facility] ER on 8/17/2024:    CBC with hemoglobin 38.5 otherwise normal CMP with potassium 3.4 otherwise unremarkable urinalysis normal UDS positive for Suboxone appropriately    EKG with sinus rhythm in the 90s, frequent PACs, no ischemic changes    Assessment / Plan      Assessment/Plan:   Diagnoses and all orders for this visit:    1. Generalized anxiety disorder (Primary)  Assessment & Plan:  Chronic problem with generalized anxiety disorder, historically having had good control taking sertraline 100 mg daily.  9 days ago had significant exacerbation of his symptoms in conjunction with more awareness of his intermittent palpitations.  EKG at the ER 4 days ago on 8/17/2024 documented frequent PACs.  Patient given IV lorazepam in the ER and a prescription for lorazepam 1 mg every 8 hours. Advised patient I do not want him taking lorazepam on a routine basis given addictive potential as well as his history of substance abuse.  Plan increase sertraline 100 mg up to 1.5 tablets or 150 mg daily, adding metoprolol XL 50 mg daily for purposes of suppressing his palpitations/PACs as well as augmenting his control of anxiety.  Use previous prescription hydroxyzine 25 mg taking 1 to 2 tablets every 6 hours as needed acute anxiety.  Reassess clinically in the next month.    Orders:  -     metoprolol succinate XL (Toprol XL) 50 MG 24 hr tablet; Take 1 tablet by mouth Daily.  Dispense: 30 tablet; Refill: 1    2. PAC (premature atrial contraction)  Assessment &  Plan:  History of well-documented palpitations with Holter monitor 9/2021 revealing predominant sinus rhythm with less than 0.01% burden of PVCs and less than 0.01% burden of PSVT, historically noting occasional palpitation fleetingly for days at a time then may not occur for weeks to a couple months.  Significant exacerbation of the PACs 9 days ago.  EKG from ER 4 days ago on 8/17/2024 revealing frequent PACs with no abnormalities noted otherwise.  PACs seem to be a trigger of his chronic anxiety.  Will initiate metoprolol XL 50 mg daily for purposes of PAC/palpitation suppression as well as to secondarily benefit his anxiety.  Monitor blood pressure and pulse rate closely per parameters discussed.    Orders:  -     metoprolol succinate XL (Toprol XL) 50 MG 24 hr tablet; Take 1 tablet by mouth Daily.  Dispense: 30 tablet; Refill: 1    3. Primary hypertension  Assessment & Plan:  Very satisfactory blood pressure control acutely as well as chronically by history taking losartan/HCTZ 100/12.5 mg daily.  Continue current regimen, monitoring closely especially given the addition of metoprolol XL 50 mg daily for purposes of suppressing his PACs as well as benefiting his anxiety.      4. Opioid abuse, in remission  Assessment & Plan:  Reports good control of his prior opiate abuse currently in remission taking Suboxone.  UDS in the ER 4 days ago appropriately detected Suboxone only.      5. Vaping nicotine dependence, tobacco product  Assessment & Plan:  Ex-cigarette smoker now vaping nicotine product.  Patient aware of the health risks and will attempt to discontinue.  Encouraged to do so.          Follow Up:   Return in about 1 month (around 9/21/2024) for Annual physical.      At [facility], we believe that sharing information builds trust and better relationships. You are receiving this note because you recently visited [facility]. It is possible you will see health information before a provider has talked  with you about it. This kind of information can be easy to misunderstand. To help you fully understand what it means for your health, we urge you to discuss this note with your provider.    Kamran Vázquez MD  [facility]

## 2024-08-30 RX ORDER — LOSARTAN POTASSIUM AND HYDROCHLOROTHIAZIDE 12.5; 1 MG/1; MG/1
1 TABLET ORAL DAILY
Qty: 30 TABLET | Refills: 3 | Status: SHIPPED | OUTPATIENT
Start: 2024-08-30

## 2024-09-20 ENCOUNTER — OFFICE VISIT (OUTPATIENT)
Dept: FAMILY MEDICINE CLINIC | Facility: CLINIC | Age: 44
End: 2024-09-20
Payer: COMMERCIAL

## 2024-09-20 VITALS
HEIGHT: 72 IN | WEIGHT: 282.8 LBS | HEART RATE: 63 BPM | BODY MASS INDEX: 38.31 KG/M2 | TEMPERATURE: 97.6 F | DIASTOLIC BLOOD PRESSURE: 90 MMHG | RESPIRATION RATE: 18 BRPM | OXYGEN SATURATION: 97 % | SYSTOLIC BLOOD PRESSURE: 152 MMHG

## 2024-09-20 DIAGNOSIS — F17.290 VAPING NICOTINE DEPENDENCE, TOBACCO PRODUCT: ICD-10-CM

## 2024-09-20 DIAGNOSIS — R73.03 PREDIABETES: ICD-10-CM

## 2024-09-20 DIAGNOSIS — R22.1 NECK MASS: ICD-10-CM

## 2024-09-20 DIAGNOSIS — I49.1 PAC (PREMATURE ATRIAL CONTRACTION): ICD-10-CM

## 2024-09-20 DIAGNOSIS — Z00.01 ENCOUNTER FOR GENERAL ADULT MEDICAL EXAMINATION WITH ABNORMAL FINDINGS: Primary | ICD-10-CM

## 2024-09-20 DIAGNOSIS — E78.2 MIXED HYPERLIPIDEMIA: ICD-10-CM

## 2024-09-20 DIAGNOSIS — F41.1 GENERALIZED ANXIETY DISORDER: ICD-10-CM

## 2024-09-20 DIAGNOSIS — F11.11 OPIOID ABUSE, IN REMISSION: ICD-10-CM

## 2024-09-20 DIAGNOSIS — E66.8 MODERATE OBESITY: ICD-10-CM

## 2024-09-20 DIAGNOSIS — G57.12 MERALGIA PARESTHETICA OF LEFT SIDE: ICD-10-CM

## 2024-09-20 DIAGNOSIS — I10 PRIMARY HYPERTENSION: ICD-10-CM

## 2024-09-20 DIAGNOSIS — B35.1 ONYCHOMYCOSIS: ICD-10-CM

## 2024-09-20 PROBLEM — E66.9 MODERATE OBESITY: Status: ACTIVE | Noted: 2024-09-20

## 2024-09-20 RX ORDER — BUSPIRONE HYDROCHLORIDE 5 MG/1
5 TABLET ORAL 3 TIMES DAILY
Qty: 60 TABLET | Refills: 1 | Status: SHIPPED | OUTPATIENT
Start: 2024-09-20

## 2024-09-20 RX ORDER — METOPROLOL SUCCINATE 50 MG/1
TABLET, EXTENDED RELEASE ORAL
Qty: 135 TABLET | Refills: 3 | Status: SHIPPED | OUTPATIENT
Start: 2024-09-20

## 2024-09-20 RX ORDER — SEMAGLUTIDE 0.25 MG/.5ML
0.25 INJECTION, SOLUTION SUBCUTANEOUS
Qty: 2 ML | Refills: 0 | Status: SHIPPED | OUTPATIENT
Start: 2024-09-20

## 2024-09-21 LAB
25(OH)D3+25(OH)D2 SERPL-MCNC: 26.1 NG/ML (ref 30–100)
ALBUMIN SERPL-MCNC: 4.6 G/DL (ref 4.1–5.1)
ALP SERPL-CCNC: 103 IU/L (ref 44–121)
ALT SERPL-CCNC: 30 IU/L (ref 0–44)
APPEARANCE UR: CLEAR
AST SERPL-CCNC: 35 IU/L (ref 0–40)
BACTERIA #/AREA URNS HPF: NORMAL /[HPF]
BASOPHILS # BLD AUTO: 0.1 X10E3/UL (ref 0–0.2)
BASOPHILS NFR BLD AUTO: 1 %
BILIRUB SERPL-MCNC: <0.2 MG/DL (ref 0–1.2)
BILIRUB UR QL STRIP: NEGATIVE
BUN SERPL-MCNC: 15 MG/DL (ref 6–24)
BUN/CREAT SERPL: 17 (ref 9–20)
CALCIUM SERPL-MCNC: 9.4 MG/DL (ref 8.7–10.2)
CASTS URNS QL MICRO: NORMAL /LPF
CHLORIDE SERPL-SCNC: 100 MMOL/L (ref 96–106)
CHOLEST SERPL-MCNC: 158 MG/DL (ref 100–199)
CO2 SERPL-SCNC: 24 MMOL/L (ref 20–29)
COLOR UR: YELLOW
CREAT SERPL-MCNC: 0.88 MG/DL (ref 0.76–1.27)
EGFRCR SERPLBLD CKD-EPI 2021: 109 ML/MIN/1.73
EOSINOPHIL # BLD AUTO: 0.3 X10E3/UL (ref 0–0.4)
EOSINOPHIL NFR BLD AUTO: 5 %
EPI CELLS #/AREA URNS HPF: NORMAL /HPF (ref 0–10)
ERYTHROCYTE [DISTWIDTH] IN BLOOD BY AUTOMATED COUNT: 13.3 % (ref 11.6–15.4)
GLOBULIN SER CALC-MCNC: 2.7 G/DL (ref 1.5–4.5)
GLUCOSE SERPL-MCNC: 96 MG/DL (ref 70–99)
GLUCOSE UR QL STRIP: NEGATIVE
HBA1C MFR BLD: 5.7 % (ref 4.8–5.6)
HCT VFR BLD AUTO: 38.7 % (ref 37.5–51)
HDLC SERPL-MCNC: 32 MG/DL
HGB BLD-MCNC: 12.9 G/DL (ref 13–17.7)
HGB UR QL STRIP: NEGATIVE
IMM GRANULOCYTES # BLD AUTO: 0.1 X10E3/UL (ref 0–0.1)
IMM GRANULOCYTES NFR BLD AUTO: 1 %
KETONES UR QL STRIP: NEGATIVE
LDLC SERPL CALC-MCNC: 74 MG/DL (ref 0–99)
LEUKOCYTE ESTERASE UR QL STRIP: NEGATIVE
LYMPHOCYTES # BLD AUTO: 1.5 X10E3/UL (ref 0.7–3.1)
LYMPHOCYTES NFR BLD AUTO: 23 %
MCH RBC QN AUTO: 29.8 PG (ref 26.6–33)
MCHC RBC AUTO-ENTMCNC: 33.3 G/DL (ref 31.5–35.7)
MCV RBC AUTO: 89 FL (ref 79–97)
MICRO URNS: NORMAL
MICRO URNS: NORMAL
MICROALBUMIN UR-MCNC: 6.5 UG/ML
MONOCYTES # BLD AUTO: 0.5 X10E3/UL (ref 0.1–0.9)
MONOCYTES NFR BLD AUTO: 8 %
NEUTROPHILS # BLD AUTO: 4 X10E3/UL (ref 1.4–7)
NEUTROPHILS NFR BLD AUTO: 62 %
NITRITE UR QL STRIP: NEGATIVE
PH UR STRIP: 7 [PH] (ref 5–7.5)
PLATELET # BLD AUTO: 143 X10E3/UL (ref 150–450)
POTASSIUM SERPL-SCNC: 4.9 MMOL/L (ref 3.5–5.2)
PROT SERPL-MCNC: 7.3 G/DL (ref 6–8.5)
PROT UR QL STRIP: NORMAL
RBC # BLD AUTO: 4.33 X10E6/UL (ref 4.14–5.8)
RBC #/AREA URNS HPF: NORMAL /HPF (ref 0–2)
SODIUM SERPL-SCNC: 142 MMOL/L (ref 134–144)
SP GR UR STRIP: 1.03 (ref 1–1.03)
TRIGL SERPL-MCNC: 322 MG/DL (ref 0–149)
TSH SERPL DL<=0.005 MIU/L-ACNC: 1.36 UIU/ML (ref 0.45–4.5)
URINALYSIS REFLEX: NORMAL
UROBILINOGEN UR STRIP-MCNC: 0.2 MG/DL (ref 0.2–1)
VLDLC SERPL CALC-MCNC: 52 MG/DL (ref 5–40)
WBC # BLD AUTO: 6.4 X10E3/UL (ref 3.4–10.8)
WBC #/AREA URNS HPF: NORMAL /HPF (ref 0–5)

## 2024-09-23 ENCOUNTER — TELEPHONE (OUTPATIENT)
Dept: FAMILY MEDICINE CLINIC | Facility: CLINIC | Age: 44
End: 2024-09-23
Payer: COMMERCIAL

## 2024-09-23 DIAGNOSIS — E55.9 VITAMIN D DEFICIENCY: Primary | ICD-10-CM

## 2024-09-23 RX ORDER — CHOLECALCIFEROL (VITAMIN D3) 25 MCG
1000 TABLET ORAL DAILY
Qty: 90 TABLET | Refills: 3 | Status: SHIPPED | OUTPATIENT
Start: 2024-09-23

## 2024-09-26 ENCOUNTER — PATIENT ROUNDING (BHMG ONLY) (OUTPATIENT)
Dept: FAMILY MEDICINE CLINIC | Facility: CLINIC | Age: 44
End: 2024-09-26
Payer: COMMERCIAL

## 2024-10-01 ENCOUNTER — TELEPHONE (OUTPATIENT)
Dept: FAMILY MEDICINE CLINIC | Facility: CLINIC | Age: 44
End: 2024-10-01
Payer: COMMERCIAL

## 2024-10-01 NOTE — TELEPHONE ENCOUNTER
I have left him a vm letting him know the PA has been done and we are waiting on his insurance to reply. TF

## 2024-10-03 ENCOUNTER — TELEPHONE (OUTPATIENT)
Dept: FAMILY MEDICINE CLINIC | Facility: CLINIC | Age: 44
End: 2024-10-03
Payer: COMMERCIAL

## 2024-10-03 DIAGNOSIS — E66.9 MODERATE OBESITY: Primary | ICD-10-CM

## 2024-10-03 RX ORDER — PHENTERMINE HYDROCHLORIDE 15 MG/1
15 CAPSULE ORAL EVERY MORNING
Qty: 30 CAPSULE | Refills: 0 | Status: SHIPPED | OUTPATIENT
Start: 2024-10-03

## 2024-10-03 NOTE — TELEPHONE ENCOUNTER
His insurance has denied the rx for wegovy. In your last note you state prescribing phenterimine. Can you please fill this for him. TF    I have spoke with him and have let him know that Dr. Andrew has called in the phenteramine for him. TF

## 2024-10-07 RX ORDER — SERTRALINE HYDROCHLORIDE 100 MG/1
TABLET, FILM COATED ORAL
Qty: 135 TABLET | Refills: 1 | Status: SHIPPED | OUTPATIENT
Start: 2024-10-07

## 2024-10-10 ENCOUNTER — CLINICAL SUPPORT (OUTPATIENT)
Dept: FAMILY MEDICINE CLINIC | Facility: CLINIC | Age: 44
End: 2024-10-10
Payer: COMMERCIAL

## 2024-10-10 DIAGNOSIS — Z23 ENCOUNTER FOR IMMUNIZATION: Primary | ICD-10-CM

## 2024-10-10 PROCEDURE — 90471 IMMUNIZATION ADMIN: CPT | Performed by: NURSE PRACTITIONER

## 2024-10-10 PROCEDURE — 90656 IIV3 VACC NO PRSV 0.5 ML IM: CPT | Performed by: NURSE PRACTITIONER

## 2024-10-10 NOTE — PROGRESS NOTES
Patient had flu shot in right deltoid, patient tolerated shot well with no side effects. Patient waited shot time in lobby.

## 2024-10-15 DIAGNOSIS — R22.1 NECK MASS: ICD-10-CM

## 2024-10-16 ENCOUNTER — TELEPHONE (OUTPATIENT)
Dept: FAMILY MEDICINE CLINIC | Facility: CLINIC | Age: 44
End: 2024-10-16
Payer: COMMERCIAL

## 2024-10-16 DIAGNOSIS — R59.0 LYMPHADENOPATHY OF LEFT CERVICAL REGION: Primary | ICD-10-CM

## 2024-10-16 NOTE — TELEPHONE ENCOUNTER
Review of results of imaging from 10/10/2024 as follows:    CT of the neck soft tissue with contrast revealing enlarged left anterior cervical chain lymph node at the level of hyoid bone measuring 11 mm in short axis diameter with no other significant abnormalities noted.  Radiologist consideration of follow-up PET/CT.    Assessment/plan:  1.  Focal enlarged left cervical lymph node as noted.  Radiologist recommending consideration of follow-up PET/CT.  Will refer to ENT for further evaluation.  2.  The above is discussed with patient who is agreeable to course of action.

## 2024-11-14 DIAGNOSIS — F41.1 GENERALIZED ANXIETY DISORDER: ICD-10-CM

## 2024-11-14 RX ORDER — BUSPIRONE HYDROCHLORIDE 5 MG/1
5 TABLET ORAL 3 TIMES DAILY
Qty: 60 TABLET | Refills: 1 | Status: SHIPPED | OUTPATIENT
Start: 2024-11-14

## 2024-11-25 RX ORDER — PRAVASTATIN SODIUM 40 MG
40 TABLET ORAL NIGHTLY
Qty: 90 TABLET | Refills: 1 | Status: SHIPPED | OUTPATIENT
Start: 2024-11-25

## 2024-12-31 DIAGNOSIS — F41.1 GENERALIZED ANXIETY DISORDER: ICD-10-CM

## 2025-01-02 DIAGNOSIS — F41.1 GENERALIZED ANXIETY DISORDER: ICD-10-CM

## 2025-01-02 RX ORDER — BUSPIRONE HYDROCHLORIDE 5 MG/1
5 TABLET ORAL 3 TIMES DAILY
Qty: 60 TABLET | Refills: 1 | Status: SHIPPED | OUTPATIENT
Start: 2025-01-02

## 2025-01-02 RX ORDER — BUSPIRONE HYDROCHLORIDE 5 MG/1
5 TABLET ORAL 3 TIMES DAILY
Qty: 60 TABLET | Refills: 1 | OUTPATIENT
Start: 2025-01-02

## 2025-01-02 NOTE — PROGRESS NOTES
Sent one month of buspar 5mg for pt. Advised that he must keep appt next week to continue getting refills.

## 2025-01-02 NOTE — TELEPHONE ENCOUNTER
I have spoke with him and have let him know that he is due for a follow up regarding his medication. He has scheduled this for 1/7/2025. TF

## 2025-01-03 RX ORDER — LOSARTAN POTASSIUM AND HYDROCHLOROTHIAZIDE 12.5; 1 MG/1; MG/1
1 TABLET ORAL DAILY
Qty: 30 TABLET | Refills: 0 | Status: SHIPPED | OUTPATIENT
Start: 2025-01-03

## 2025-01-03 NOTE — TELEPHONE ENCOUNTER
Caller: Iván Jeffers    Relationship: Self    Best call back number: [phone]     Requested Prescriptions:   Requested Prescriptions     Pending Prescriptions Disp Refills    losartan-hydrochlorothiazide (HYZAAR) 100-12.5 MG per tablet 30 tablet 3     Sig: Take 1 tablet by mouth Daily.        Pharmacy where request should be sent: [id number] - [address]  AT [facility] [address] - [phone]  - [phone] FX     Last office visit with prescribing clinician: 9/20/2024   Last telemedicine visit with prescribing clinician: Visit date not found   Next office visit with prescribing clinician: 1/7/2025     Additional details provided by patient: PATIENT HAS AN APPOINTMENT FOR 01/07/25.    Does the patient have less than a 3 day supply:  [x] Yes  [] No    Would you like a call back once the refill request has been completed: [] Yes [] No    If the office needs to give you a call back, can they leave a voicemail: [] Yes [] No    Nilda Gay Rep   01/03/25 12:51 EST

## 2025-01-07 ENCOUNTER — TELEPHONE (OUTPATIENT)
Dept: FAMILY MEDICINE CLINIC | Facility: CLINIC | Age: 45
End: 2025-01-07

## 2025-01-14 ENCOUNTER — OFFICE VISIT (OUTPATIENT)
Dept: FAMILY MEDICINE CLINIC | Facility: CLINIC | Age: 45
End: 2025-01-14
Payer: COMMERCIAL

## 2025-01-14 VITALS
SYSTOLIC BLOOD PRESSURE: 139 MMHG | BODY MASS INDEX: 40.58 KG/M2 | TEMPERATURE: 97.2 F | DIASTOLIC BLOOD PRESSURE: 88 MMHG | OXYGEN SATURATION: 100 % | HEIGHT: 72 IN | HEART RATE: 81 BPM | RESPIRATION RATE: 16 BRPM | WEIGHT: 299.6 LBS

## 2025-01-14 DIAGNOSIS — I10 PRIMARY HYPERTENSION: ICD-10-CM

## 2025-01-14 DIAGNOSIS — E66.01 MORBID OBESITY: ICD-10-CM

## 2025-01-14 DIAGNOSIS — F11.11 OPIOID ABUSE, IN REMISSION: ICD-10-CM

## 2025-01-14 DIAGNOSIS — R30.0 DYSURIA: ICD-10-CM

## 2025-01-14 DIAGNOSIS — F41.1 GENERALIZED ANXIETY DISORDER: Primary | ICD-10-CM

## 2025-01-14 DIAGNOSIS — Z23 NEED FOR COVID-19 VACCINE: ICD-10-CM

## 2025-01-14 LAB
BILIRUB BLD-MCNC: NEGATIVE MG/DL
CLARITY, POC: CLEAR
COLOR UR: YELLOW
GLUCOSE UR STRIP-MCNC: NEGATIVE MG/DL
KETONES UR QL: NEGATIVE
LEUKOCYTE EST, POC: NEGATIVE
NITRITE UR-MCNC: NEGATIVE MG/ML
PH UR: 6 [PH] (ref 5–8)
PROT UR STRIP-MCNC: NEGATIVE MG/DL
RBC # UR STRIP: NEGATIVE /UL
SP GR UR: 1.03 (ref 1–1.03)
UROBILINOGEN UR QL: NORMAL

## 2025-01-14 PROCEDURE — 90480 ADMN SARSCOV2 VAC 1/ONLY CMP: CPT | Performed by: INTERNAL MEDICINE

## 2025-01-14 PROCEDURE — 91320 SARSCV2 VAC 30MCG TRS-SUC IM: CPT | Performed by: INTERNAL MEDICINE

## 2025-01-14 PROCEDURE — 81002 URINALYSIS NONAUTO W/O SCOPE: CPT | Performed by: INTERNAL MEDICINE

## 2025-01-14 PROCEDURE — 99214 OFFICE O/P EST MOD 30 MIN: CPT | Performed by: INTERNAL MEDICINE

## 2025-01-14 NOTE — LETTER
[facility]  Vaccine Consent Form    Patient Name:  Iván Jeffers  Patient :  1980     Vaccine(s) Ordered    COVID-19 (Pfizer) 12yrs+ (COMIRNATY)        Screening Checklist  The following questions should be completed prior to vaccination. If you answer “yes” to any question, it does not necessarily mean you should not be vaccinated. It just means we may need to clarify or ask more questions. If a question is unclear, please ask your healthcare provider to explain it.    Yes No   Any fever or moderate to severe illness today (mild illness and/or antibiotic treatment are not contraindications)?     Do you have a history of a serious reaction to any previous vaccinations, such as anaphylaxis, encephalopathy within 7 days, Guillain-[place] syndrome within 6 weeks, seizure?     Have you received any live vaccine(s) (e.g MMR, ROSEANNE) or any other vaccines in the last month (to ensure duplicate doses aren't given)?     Do you have an anaphylactic allergy to latex (DTaP, DTaP-IPV, Hep A, Hep B, MenB, RV, Td, Tdap), baker’s yeast (Hep B, HPV), polysorbates (RSV, nirsevimab, PCV 20, Rotavirrus, Tdap, Shingrix), or gelatin (ROSEANNE, MMR)?     Do you have an anaphylactic allergy to neomycin (Rabies, ROSEANNE, MMR, IPV, Hep A), polymyxin B (IPV), or streptomycin (IPV)?      Any cancer, leukemia, AIDS, or other immune system disorder? (ROSEANNE, MMR, RV)     Do you have a parent, brother, or sister with an immune system problem (if immune competence of vaccine recipient clinically verified, can proceed)? (MMR, ROSEANNE)     Any recent steroid treatments for >2 weeks, chemotherapy, or radiation treatment? (ROSEANNE, MMR)     Have you received antibody-containing blood transfusions or IVIG in the past 11 months (recommended interval is dependent on product)? (MMR, ROSEANNE)     Have you taken antiviral drugs (acyclovir, famciclovir, valacyclovir for ROSEANNE) in the last 24 or 48 hours, respectively?      Are you pregnant or planning to become pregnant within  "1 month? (ROSEANNE, MMR, HPV, IPV, MenB, Abrexvy; For Hep B- refer to Engerix-B; For RSV - Abrysvo is indicated for 32-36 weeks of pregnancy from September to January)     For infants, have you ever been told your child has had intussusception or a medical emergency involving obstruction of the intestine (Rotavirus)? If not for an infant, can skip this question.         *Ordering Physicians/APC should be consulted if \"yes\" is checked by the patient or guardian above.  I have received, read, and understand the Vaccine Information Statement (VIS) for each vaccine ordered.  I have considered my or my child's health status as well as the health status of my close contacts.  I have taken the opportunity to discuss my vaccine questions with my or my child's health care provider.   I have requested that the ordered vaccine(s) be given to me or my child.  I understand the benefits and risks of the vaccines.  I understand that I should remain in the clinic for 15 minutes after receiving the vaccine(s).  _________________________________________________________  Signature of Patient or Parent/Legal Guardian ____________________  Date     "

## 2025-01-14 NOTE — PROGRESS NOTES
Follow Up Office Visit      Date: 2025   Patient Name: Iván Jeffers  : 1980   MRN: [id number]     Chief Complaint:    Chief Complaint   Patient presents with    follow up anxiety     Painful urination       History of Present Illness: Iván Jeffers is a 44 y.o. male who is here today for his anxiety disorder suppression, 2 months ago having had the addition of BuSpar 5 mg twice daily to his chronic Zoloft 150 mg daily, and an increase in metoprolol XL from 50 mg to 75 mg daily.  Improvement in his anxiety with only rare symptoms at this time.  He also had been having subjective palpitations with previous noted PACs which have essentially resolved with the higher dose of metoprolol.  He does have a history of substance use disorder in remission on Suboxone with last use in .  Has hypertension taking metoprolol 75 mg daily and losartan/HCTZ 100/12.5 mg daily with blood pressures in the 120s to 130s over 70-80 range.  No chest pains or significant applications of smell, no dyspnea or edema.  He also did have note of a left neck mass in 10/2024 subsequent CT scan revealing an enlarged lymph node.  He was referred to Dr. Pat Harris of ENT who felt he had benign minimal lymph node enlargement of 11 mm with no specific follow-up recommended.  Patient has noted an increasing size of the node.  He does have a history of obesity having gained 17 pounds in the last 3+ months.  Admittedly has a poor diet including drinking regular sodas with a sedentary lifestyle.  Previously Wegovy was not covered by insurance, and was subsequent prescribed phentermine which she did not tolerate given shaky side effects.  He also notes 3 weeks ago having an episode of dysuria with painful ejaculation after intercourse with his wife.  Has noted some irritation on the tip of his penis since that time.  No urgency hematuria pelvic or back pain.  No fevers or chills.  No rash.  Does not have any suspicion of having  "contracted an STD.    Subjective      Review of Systems:   Review of Systems    I have reviewed the patients family history, social history, past medical history, past surgical history and have updated it as appropriate.     Medications:     Current Outpatient Medications:     buprenorphine-naloxone (SUBOXONE) 8-2 MG film film, , Disp: , Rfl:     busPIRone (BUSPAR) 5 MG tablet, Take 1 tablet by mouth 3 (Three) Times a Day., Disp: 60 tablet, Rfl: 1    cholecalciferol (Vitamin D) 25 MCG (1000 UT) tablet, Take 1 tablet by mouth Daily., Disp: 90 tablet, Rfl: 3    hydrOXYzine pamoate (VISTARIL) 25 MG capsule, Take 1 capsule by mouth 3 (Three) Times a Day As Needed for Anxiety., Disp: 30 capsule, Rfl: 3    LORazepam (ATIVAN) 1 MG tablet, Take 1 tablet by mouth 3 times a day., Disp: , Rfl:     losartan-hydrochlorothiazide (HYZAAR) 100-12.5 MG per tablet, Take 1 tablet by mouth Daily., Disp: 30 tablet, Rfl: 0    metoprolol succinate XL (TOPROL-XL) 50 MG 24 hr tablet, 1.5 tablets daily, Disp: 135 tablet, Rfl: 3    phentermine 15 MG capsule, Take 1 capsule by mouth Every Morning., Disp: 30 capsule, Rfl: 0    pravastatin (PRAVACHOL) 40 MG tablet, TAKE 1 TABLET BY MOUTH EVERY NIGHT, Disp: 90 tablet, Rfl: 1    sertraline (ZOLOFT) 100 MG tablet, TAKE 1 TABLET BY MOUTH IN THE MORNING AND 1/2 TABLET IN EVENING, Disp: 135 tablet, Rfl: 1    Tirzepatide-Weight Management (ZEPBOUND) 2.5 MG/0.5ML solution auto-injector, Inject 0.5 mL under the skin into the appropriate area as directed 1 (One) Time Per Week., Disp: 2 mL, Rfl: 0    Allergies:   No Known Allergies    Objective     Physical Exam: Please see above  Vital Signs:   Vitals:    01/14/25 0833   BP: 139/88   BP Location: Left arm   Patient Position: Sitting   Cuff Size: Adult   Pulse: 81   Resp: 16   Temp: 97.2 °F (36.2 °C)   TempSrc: Temporal   SpO2: 100%   Weight: 136 kg (299 lb 9.6 oz)   Height: 182.2 cm (71.75\")     Body mass index is 40.92 kg/m².          Physical " Exam  Constitutional:       General: He is not in acute distress.     Appearance: Normal appearance. He is obese. He is not ill-appearing.      Comments: Pleasant, healthy, NAD, BMI 40.9 reflecting a 17 pound weight gain in the last 3-month   Neck:      Comments: Palpable symmetric fullness on the superior clavicular region bilaterally near the junction with the neck with no distinctive masses  Cardiovascular:      Rate and Rhythm: Normal rate and regular rhythm.      Heart sounds: Normal heart sounds. No murmur heard.     No friction rub. No gallop.   Pulmonary:      Effort: Pulmonary effort is normal. No respiratory distress.      Breath sounds: Normal breath sounds.   Abdominal:      General: Bowel sounds are normal. There is no distension.      Palpations: Abdomen is soft. There is no mass.      Tenderness: There is no abdominal tenderness. There is no guarding or rebound.      Hernia: No hernia is present.   Genitourinary:     Comments: Normal circumcised male with no penile abnormalities, no discharge or rash, testes descended bilaterally with no nodules or tenderness  Musculoskeletal:      Cervical back: No rigidity or tenderness.   Lymphadenopathy:      Cervical: No cervical adenopathy.   Neurological:      General: No focal deficit present.      Mental Status: He is alert and oriented to person, place, and time.   Psychiatric:         Mood and Affect: Mood normal.         Behavior: Behavior normal.         Thought Content: Thought content normal.         Judgment: Judgment normal.         Procedures    Results:   Labs:   Hemoglobin A1C   Date Value Ref Range Status   09/20/2024 5.7 (H) 4.8 - 5.6 % Final     Comment:              Prediabetes: 5.7 - 6.4           Diabetes: >6.4           Glycemic control for adults with diabetes: <7.0       TSH   Date Value Ref Range Status   09/20/2024 1.360 0.450 - 4.500 uIU/mL Final   04/22/2021 2.740 0.270 - 4.200 uIU/mL Final        POCT Results (if applicable):    Results for orders placed or performed in visit on 01/14/25   POC Urinalysis Dipstick    Collection Time: 01/14/25  9:14 AM    Specimen: Urine   Result Value Ref Range    Color Yellow Yellow, Straw, Dark Yellow, Cristina    Clarity, UA Clear Clear    Glucose, UA Negative Negative mg/dL    Bilirubin Negative Negative    Ketones, UA Negative Negative    Specific Gravity  1.030 1.005 - 1.030    Blood, UA Negative Negative    pH, Urine 6.0 5.0 - 8.0    Protein, POC Negative Negative mg/dL    Urobilinogen, UA 0.2 E.U./dL Normal, 0.2 E.U./dL    Leukocytes Negative Negative    Nitrite, UA Negative Negative       Imaging:   No valid procedures specified.       Assessment / Plan      Assessment/Plan:   Diagnoses and all orders for this visit:    1. Generalized anxiety disorder (Primary)  Assessment & Plan:  Much improved with the addition of BuSpar 5 mg twice daily in 9/2020 for continuing his chronic Zoloft 150 mg daily.  Also gets out of benefit from metoprolol XL 50 mg at 1.5 tablets or 75 mg daily.  Continue current regimen.      2. Opioid abuse, in remission  Assessment & Plan:  Maintaining opiate abstinence on Suboxone prescribed by outside provider, UDS in the ER in 8/2024 appropriately detecting Suboxone only      3. Primary hypertension  Assessment & Plan:  Borderline control acutely, chronically notes good control of blood pressure taking metoprolol XL 50 mg at 1.5 tablets to 75 mg daily and losartan/HCTZ 100/12.5 mg daily.  Continue current regimen with monitoring, pursue healthy lifestyle efforts with diet exercise and avoidance of added salt.      4. Morbid obesity  Assessment & Plan:  17 pound weight gain over the last 3+ months simply by poor lifestyle.  Discussed need to improve all these parameters including diet and exercise.  Previous prescription of Wegovy was not covered by insurance, prescription subsequently phentermine was discontinued by patient given sensation of shakiness.  Will initiate trial of  prescribing Zepbound 2.5 mg subcu weekly after discussing medical contraindications, potential side effects and mechanism of action.  Reassess clinically in 1 month assuming prescription is covered, at that time planning titration per standard protocol assuming tolerating.  If this Zepbound is in fact not covered, we will then consider prescription of Topamax to augment efforts at weight loss    Orders:  -     Tirzepatide-Weight Management (ZEPBOUND) 2.5 MG/0.5ML solution auto-injector; Inject 0.5 mL under the skin into the appropriate area as directed 1 (One) Time Per Week.  Dispense: 2 mL; Refill: 0    5. Dysuria  Assessment & Plan:  Vague dysuria noted on the urethral orifice after sexual intercourse with his wife 3 weeks ago.  There is no internal urethral discomfort frequency urgency or hematuria.  No testicular discomfort and objectively his exam is normal.  Urinalysis completely normal.  I did discuss simply trial of hydrocortisone 1% cream applied to the urethral orifice 2-3 times daily over the next several days.  Advise if not resolving symptoms    Orders:  -     POC Urinalysis Dipstick    6. Need for COVID-19 vaccine  -     COVID-19 (Pfizer) 12yrs+ (COMIRNATY)        Vaccine Counseling:  “Discussed risks/benefits to vaccination, reviewed components of the vaccine, discussed VIS, discussed informed consent, informed consent obtained. Patient/Parent was allowed to accept or refuse vaccine. Questions answered to satisfactory state of patient/Parent. We reviewed typical age appropriate and seasonally appropriate vaccinations. Reviewed immunization history and updated state vaccination form as needed. Patient was counseled on COVID-19    Follow Up:   Return in about 1 month (around 2/14/2025) for Recheck.      At [facility], we believe that sharing information builds trust and better relationships. You are receiving this note because you recently visited [facility]. It is possible you will see [facility]  information before a provider has talked with you about it. This kind of information can be easy to misunderstand. To help you fully understand what it means for your health, we urge you to discuss this note with your provider.    Kamran Vázquez MD  [facility] [place]

## 2025-01-15 NOTE — ASSESSMENT & PLAN NOTE
Much improved with the addition of BuSpar 5 mg twice daily in 9/2020 for continuing his chronic Zoloft 150 mg daily.  Also gets out of benefit from metoprolol XL 50 mg at 1.5 tablets or 75 mg daily.  Continue current regimen.

## 2025-01-15 NOTE — ASSESSMENT & PLAN NOTE
Borderline control acutely, chronically notes good control of blood pressure taking metoprolol XL 50 mg at 1.5 tablets to 75 mg daily and losartan/HCTZ 100/12.5 mg daily.  Continue current regimen with monitoring, pursue healthy lifestyle efforts with diet exercise and avoidance of added salt.

## 2025-01-15 NOTE — ASSESSMENT & PLAN NOTE
17 pound weight gain over the last 3+ months simply by poor lifestyle.  Discussed need to improve all these parameters including diet and exercise.  Previous prescription of Wegovy was not covered by insurance, prescription subsequently phentermine was discontinued by patient given sensation of shakiness.  Will initiate trial of prescribing Zepbound 2.5 mg subcu weekly after discussing medical contraindications, potential side effects and mechanism of action.  Reassess clinically in 1 month assuming prescription is covered, at that time planning titration per standard protocol assuming tolerating.  If this Zepbound is in fact not covered, we will then consider prescription of Topamax to augment efforts at weight loss

## 2025-01-15 NOTE — ASSESSMENT & PLAN NOTE
Vague dysuria noted on the urethral orifice after sexual intercourse with his wife 3 weeks ago.  There is no internal urethral discomfort frequency urgency or hematuria.  No testicular discomfort and objectively his exam is normal.  Urinalysis completely normal.  I did discuss simply trial of hydrocortisone 1% cream applied to the urethral orifice 2-3 times daily over the next several days.  Advise if not resolving symptoms

## 2025-01-15 NOTE — ASSESSMENT & PLAN NOTE
Maintaining opiate abstinence on Suboxone prescribed by outside provider, UDS in the ER in 8/2024 appropriately detecting Suboxone only

## 2025-01-29 RX ORDER — LOSARTAN POTASSIUM AND HYDROCHLOROTHIAZIDE 12.5; 1 MG/1; MG/1
1 TABLET ORAL DAILY
Qty: 30 TABLET | Refills: 3 | Status: SHIPPED | OUTPATIENT
Start: 2025-01-29

## 2025-01-29 RX ORDER — SERTRALINE HYDROCHLORIDE 100 MG/1
TABLET, FILM COATED ORAL
Qty: 135 TABLET | Refills: 1 | Status: SHIPPED | OUTPATIENT
Start: 2025-01-29

## 2025-02-11 DIAGNOSIS — F41.1 GENERALIZED ANXIETY DISORDER: ICD-10-CM

## 2025-02-11 RX ORDER — BUSPIRONE HYDROCHLORIDE 5 MG/1
5 TABLET ORAL 3 TIMES DAILY
Qty: 60 TABLET | Refills: 1 | Status: SHIPPED | OUTPATIENT
Start: 2025-02-11

## 2025-02-11 NOTE — TELEPHONE ENCOUNTER
Caller: Iván Jeffers    Relationship: Self    Best call back number: [phone]     Requested Prescriptions:   Requested Prescriptions     Pending Prescriptions Disp Refills    busPIRone (BUSPAR) 5 MG tablet 60 tablet 1     Sig: Take 1 tablet by mouth 3 (Three) Times a Day.        Pharmacy where request should be sent: [id number] - [facility] [address]  AT [facility] [address] - [phone]  - [phone] FX     Last office visit with prescribing clinician: 1/14/2025   Last telemedicine visit with prescribing clinician: Visit date not found   Next office visit with prescribing clinician: 2/14/2025     Additional details provided by patient: TOOK LAST DOSE TODAY    Does the patient have less than a 3 day supply:  [x] Yes  [] No    Would you like a call back once the refill request has been completed: [] Yes [x] No    If the office needs to give you a call back, can they leave a voicemail: [x] Yes [] No    Nilda Jimenez Rep   02/11/25 13:46 EST

## 2025-03-26 DIAGNOSIS — F41.1 GENERALIZED ANXIETY DISORDER: ICD-10-CM

## 2025-03-26 RX ORDER — BUSPIRONE HYDROCHLORIDE 5 MG/1
5 TABLET ORAL 3 TIMES DAILY
Qty: 60 TABLET | Refills: 1 | Status: SHIPPED | OUTPATIENT
Start: 2025-03-26

## 2025-03-26 RX ORDER — SERTRALINE HYDROCHLORIDE 100 MG/1
100 TABLET, FILM COATED ORAL DAILY
Qty: 135 TABLET | Refills: 1 | Status: SHIPPED | OUTPATIENT
Start: 2025-03-26

## 2025-03-26 NOTE — TELEPHONE ENCOUNTER
Caller: Iván Jeffers    Relationship: Self    Best call back number: [phone]     Requested Prescriptions:   Requested Prescriptions     Pending Prescriptions Disp Refills    busPIRone (BUSPAR) 5 MG tablet 60 tablet 1     Sig: Take 1 tablet by mouth 3 (Three) Times a Day.    sertraline (ZOLOFT) 100 MG tablet 135 tablet 1        Pharmacy where request should be sent: [facility] #[id number] - [place], [place] - [address]  AT [facility] [address] - [phone]  - [phone] FX     Last office visit with prescribing clinician: 1/14/2025   Last telemedicine visit with prescribing clinician: Visit date not found   Next office visit with prescribing clinician: Visit date not found     Additional details provided by patient: OUT OF MEDICATION     Does the patient have less than a 3 day supply:  [x] Yes  [] No    Would you like a call back once the refill request has been completed: [] Yes [x] No    If the office needs to give you a call back, can they leave a voicemail: [] Yes [x] No    Nilda Fraire Rep   03/26/25 14:19 EDT

## 2025-04-10 ENCOUNTER — OFFICE VISIT (OUTPATIENT)
Dept: FAMILY MEDICINE CLINIC | Facility: CLINIC | Age: 45
End: 2025-04-10
Payer: COMMERCIAL

## 2025-04-10 VITALS
OXYGEN SATURATION: 95 % | DIASTOLIC BLOOD PRESSURE: 84 MMHG | WEIGHT: 309.6 LBS | HEART RATE: 88 BPM | SYSTOLIC BLOOD PRESSURE: 141 MMHG | BODY MASS INDEX: 41.93 KG/M2 | HEIGHT: 72 IN | TEMPERATURE: 97.8 F

## 2025-04-10 DIAGNOSIS — R30.0 BURNING WITH URINATION: ICD-10-CM

## 2025-04-10 DIAGNOSIS — E66.01 MORBID OBESITY: ICD-10-CM

## 2025-04-10 DIAGNOSIS — N34.2 URETHRITIS: Primary | ICD-10-CM

## 2025-04-10 LAB
BILIRUB BLD-MCNC: NEGATIVE MG/DL
CLARITY, POC: CLEAR
COLOR UR: YELLOW
GLUCOSE UR STRIP-MCNC: NEGATIVE MG/DL
KETONES UR QL: NEGATIVE
LEUKOCYTE EST, POC: NEGATIVE
NITRITE UR-MCNC: NEGATIVE MG/ML
PH UR: 6 [PH] (ref 5–8)
PROT UR STRIP-MCNC: NEGATIVE MG/DL
RBC # UR STRIP: NEGATIVE /UL
SP GR UR: 1.01 (ref 1–1.03)
UROBILINOGEN UR QL: NORMAL

## 2025-04-10 RX ORDER — DOXYCYCLINE 100 MG/1
100 CAPSULE ORAL 2 TIMES DAILY
Qty: 14 CAPSULE | Refills: 0 | Status: SHIPPED | OUTPATIENT
Start: 2025-04-10 | End: 2025-04-17

## 2025-04-10 NOTE — ASSESSMENT & PLAN NOTE
Patient with nonspecific recurrent tingling dysuria at the end of his penis, suspect nonspecific urethritis.  Urinalysis was normal today.  Will empirically treat with doxycycline, with patient advised if symptoms not resolving with treatment or for any significant recurrence, at which point we will simply refer him to urology without specific related follow-up visit.

## 2025-04-10 NOTE — PROGRESS NOTES
Follow Up Office Visit      Date: 04/10/2025   Patient Name: Iván Jeffers  : 1980   MRN: [id number]     Chief Complaint:    Chief Complaint   Patient presents with    burning urination       History of Present Illness: Iván Jeffers is a 44 y.o. male who is here today for recurrent symptoms of dysuria or stinging at the tip of his penis, no hematuria frequency urgency or incontinence.  No abdominal pain, chronically has intermittent low back pain at his baseline.  Denies any chance of an STD.  Was evaluated for the same problem in 2025 having had a normal urinalysis at that time, suspected to have nonspecific irritation of the urethral meatus and prescribed hydrocortisone cream which seemed to transiently help.  We also discussed patient's obesity previously having been prescribed phentermine 15 mg daily which did not benefit.  Patient indicates has improved his diet and exercise but does have room for improvement..  Mentions that his wife works at a compounding pharmacy and that he can get compounded GLP-1 agonist for significantly reduced price.  He is interested in pursuing this option for weight loss.    Subjective      Review of Systems:   Review of Systems    I have reviewed the patients family history, social history, past medical history, past surgical history and have updated it as appropriate.     Medications:     Current Outpatient Medications:     buprenorphine-naloxone (SUBOXONE) 8-2 MG film film, , Disp: , Rfl:     busPIRone (BUSPAR) 5 MG tablet, Take 1 tablet by mouth 3 (Three) Times a Day., Disp: 60 tablet, Rfl: 1    cholecalciferol (Vitamin D) 25 MCG (1000 UT) tablet, Take 1 tablet by mouth Daily., Disp: 90 tablet, Rfl: 3    hydrOXYzine pamoate (VISTARIL) 25 MG capsule, Take 1 capsule by mouth 3 (Three) Times a Day As Needed for Anxiety., Disp: 30 capsule, Rfl: 3    LORazepam (ATIVAN) 1 MG tablet, Take 1 tablet by mouth 3 times a day., Disp: , Rfl:     losartan-hydrochlorothiazide  "(HYZAAR) 100-12.5 MG per tablet, TAKE 1 TABLET BY MOUTH DAILY, Disp: 30 tablet, Rfl: 3    metoprolol succinate XL (TOPROL-XL) 50 MG 24 hr tablet, 1.5 tablets daily, Disp: 135 tablet, Rfl: 3    pravastatin (PRAVACHOL) 40 MG tablet, TAKE 1 TABLET BY MOUTH EVERY NIGHT, Disp: 90 tablet, Rfl: 1    sertraline (ZOLOFT) 100 MG tablet, Take 1 tablet by mouth Daily., Disp: 135 tablet, Rfl: 1    doxycycline (VIBRAMYCIN) 100 MG capsule, Take 1 capsule by mouth 2 (Two) Times a Day for 7 days., Disp: 14 capsule, Rfl: 0    Semaglutide-Weight Management 0.25 MG/0.5ML solution auto-injector, Inject 0.5 mL under the skin into the appropriate area as directed Every 7 (Seven) Days., Disp: 2 mL, Rfl: 0    Allergies:   No Known Allergies    Objective     Physical Exam: Please see above  Vital Signs:   Vitals:    04/10/25 0929   BP: 141/84   BP Location: Left arm   Patient Position: Sitting   Cuff Size: Adult   Pulse: 88   Temp: 97.8 °F (36.6 °C)   TempSrc: Temporal   SpO2: 95%   Weight: (!) 140 kg (309 lb 9.6 oz)   Height: 182.2 cm (71.75\")     Body mass index is 42.28 kg/m².          Physical Exam  Constitutional:       Appearance: Normal appearance. He is obese.      Comments: Pleasant, healthy, NAD, BMI 42.2, increased 10 pounds over the last 3 months and increased 27 pounds over the last 6 months   Cardiovascular:      Rate and Rhythm: Normal rate and regular rhythm.      Heart sounds: Normal heart sounds. No murmur heard.     No friction rub. No gallop.   Pulmonary:      Effort: Pulmonary effort is normal. No respiratory distress.      Breath sounds: Normal breath sounds.   Abdominal:      General: Bowel sounds are normal. There is no distension.      Palpations: Abdomen is soft. There is no mass.      Tenderness: There is no abdominal tenderness. There is no right CVA tenderness, left CVA tenderness, guarding or rebound.      Hernia: No hernia is present.   Genitourinary:     Comments: Normal circumcised male, no penile or urethral " tenderness, no meatal erythema or discharge, testes descended bilaterally with no nodules or tenderness, no inguinal herniation or adenopathy, no genital rash  Neurological:      General: No focal deficit present.      Mental Status: He is alert.   Psychiatric:         Mood and Affect: Mood normal.         Procedures    Results:   Labs:   Hemoglobin A1C   Date Value Ref Range Status   09/20/2024 5.7 (H) 4.8 - 5.6 % Final     Comment:              Prediabetes: 5.7 - 6.4           Diabetes: >6.4           Glycemic control for adults with diabetes: <7.0       TSH   Date Value Ref Range Status   09/20/2024 1.360 0.450 - 4.500 uIU/mL Final   04/22/2021 2.740 0.270 - 4.200 uIU/mL Final        POCT Results (if applicable):   Results for orders placed or performed in visit on 04/10/25   POC Urinalysis Dipstick    Collection Time: 04/10/25  9:37 AM    Specimen: Urine   Result Value Ref Range    Color Yellow Yellow, Straw, Dark Yellow, Cristina    Clarity, UA Clear Clear    Glucose, UA Negative Negative mg/dL    Bilirubin Negative Negative    Ketones, UA Negative Negative    Specific Gravity  1.015 1.005 - 1.030    Blood, UA Negative Negative    pH, Urine 6.0 5.0 - 8.0    Protein, POC Negative Negative mg/dL    Urobilinogen, UA 0.2 E.U./dL Normal, 0.2 E.U./dL    Leukocytes Negative Negative    Nitrite, UA Negative Negative           Assessment / Plan      Assessment/Plan:   Diagnoses and all orders for this visit:    1. Urethritis (Primary)  Assessment & Plan:  Patient with nonspecific recurrent tingling dysuria at the end of his penis, suspect nonspecific urethritis.  Urinalysis was normal today.  Will empirically treat with doxycycline, with patient advised if symptoms not resolving with treatment or for any significant recurrence, at which point we will simply refer him to urology without specific related follow-up visit.       Orders:  -     doxycycline (VIBRAMYCIN) 100 MG capsule; Take 1 capsule by mouth 2 (Two) Times a Day  for 7 days.  Dispense: 14 capsule; Refill: 0    2. Burning with urination  Assessment & Plan:  Patient with nonspecific recurrent tingling dysuria at the end of his penis, suspect nonspecific urethritis.  Urinalysis was normal today.  Will empirically treat with doxycycline, with patient advised if symptoms not resolving with treatment or for any significant recurrence, at which point we will simply refer him to urology without specific related follow-up visit.    Orders:  -     POC Urinalysis Dipstick  -     doxycycline (VIBRAMYCIN) 100 MG capsule; Take 1 capsule by mouth 2 (Two) Times a Day for 7 days.  Dispense: 14 capsule; Refill: 0    3. Morbid obesity  Assessment & Plan:  Longstanding struggles with obesity, having been prescribed Zepbound in 1/2025 which was not covered by his insurance, but patient now indicating his wife works at a compounding pharmacy and he can get related GLP-1 agonist at a discounted rate.  We discussed mechanism of action of GLP-1 agonist, contraindications as well as potential side effects, and given patient would qualify, will initiate compounded semaglutide 0.25 mg subcu weekly, reassessing clinically in 1 month for reevaluation and further titration up as tolerated.  Patient is attempting improve his lifestyle with diet and exercise.  Advise of any concerns.    Orders:  -     Semaglutide-Weight Management 0.25 MG/0.5ML solution auto-injector; Inject 0.5 mL under the skin into the appropriate area as directed Every 7 (Seven) Days.  Dispense: 2 mL; Refill: 0        Follow Up:   Return in about 1 month (around 5/10/2025) for Recheck.      At [facility], we believe that sharing information builds trust and better relationships. You are receiving this note because you recently visited [facility]. It is possible you will see health information before a provider has talked with you about it. This kind of information can be easy to misunderstand. To help you fully understand what  it means for your health, we urge you to discuss this note with your provider.    Kamran Vázquez MD  [facility] [place]

## 2025-04-10 NOTE — ASSESSMENT & PLAN NOTE
Longstanding struggles with obesity, having been prescribed Zepbound in 1/2025 which was not covered by his insurance, but patient now indicating his wife works at a compounding pharmacy and he can get related GLP-1 agonist at a discounted rate.  We discussed mechanism of action of GLP-1 agonist, contraindications as well as potential side effects, and given patient would qualify, will initiate compounded semaglutide 0.25 mg subcu weekly, reassessing clinically in 1 month for reevaluation and further titration up as tolerated.  Patient is attempting improve his lifestyle with diet and exercise.  Advise of any concerns.

## 2025-05-08 DIAGNOSIS — F41.1 GENERALIZED ANXIETY DISORDER: ICD-10-CM

## 2025-05-08 RX ORDER — BUSPIRONE HYDROCHLORIDE 5 MG/1
5 TABLET ORAL 3 TIMES DAILY
Qty: 60 TABLET | Refills: 1 | Status: SHIPPED | OUTPATIENT
Start: 2025-05-08

## 2025-05-08 NOTE — TELEPHONE ENCOUNTER
Caller: Iván Jeffers    Relationship: Self    Best call back number: [phone]    Requested Prescriptions:   Requested Prescriptions     Pending Prescriptions Disp Refills    busPIRone (BUSPAR) 5 MG tablet [Pharmacy Med Name: BUSPIRONE 5MG TABLETS] 60 tablet 1     Sig: TAKE 1 TABLET BY MOUTH THREE TIMES DAILY        Pharmacy where request should be sent: [id number] - [place], [place] - [address]  AT [facility] [address] - [phone]  - [phone]     Last office visit with prescribing clinician: 4/10/2025   Last telemedicine visit with prescribing clinician: Visit date not found   Next office visit with prescribing clinician: Visit date not found         Does the patient have less than a 3 day supply:  [x] Yes  [] No      Nilda Casillas Rep   05/08/25 10:49 EDT

## 2025-05-28 ENCOUNTER — TELEPHONE (OUTPATIENT)
Dept: FAMILY MEDICINE CLINIC | Facility: CLINIC | Age: 45
End: 2025-05-28
Payer: COMMERCIAL

## 2025-05-28 NOTE — TELEPHONE ENCOUNTER
In his 4/10/2025 note Dr. Andrew states that if still having symptoms than a refer will be sent to a urologist. He would like for this refer to be sent for him please. TF      I have let him know this has been sent for him. He understands that their office will contact him for an appt. TF

## 2025-05-28 NOTE — TELEPHONE ENCOUNTER
Caller: Iván Jeffers    Relationship: Self    Best call back number: [phone]  What is the best time to reach you: ANYTIME     Who are you requesting to speak with (clinical staff, provider,  specific staff member): CLINICAL STAFF     PATIENT STATES LAST ANTIBIOTIC ONLY HELPED TEMPORARY WITH BURNING WITH URINATION. PATIENT IS WANTING TO SPEAK WITH STAFF ABOUT NEXT OPTIONS. PLEASE CALL TO DISCUSS.

## 2025-05-29 DIAGNOSIS — R30.0 BURNING WITH URINATION: ICD-10-CM

## 2025-05-29 DIAGNOSIS — N34.2 URETHRITIS: Primary | ICD-10-CM

## 2025-05-30 RX ORDER — PRAVASTATIN SODIUM 40 MG
40 TABLET ORAL NIGHTLY
Qty: 90 TABLET | Refills: 1 | Status: SHIPPED | OUTPATIENT
Start: 2025-05-30

## 2025-05-30 RX ORDER — LOSARTAN POTASSIUM AND HYDROCHLOROTHIAZIDE 12.5; 1 MG/1; MG/1
1 TABLET ORAL DAILY
Qty: 90 TABLET | Refills: 1 | Status: SHIPPED | OUTPATIENT
Start: 2025-05-30

## 2025-06-18 DIAGNOSIS — F41.1 GENERALIZED ANXIETY DISORDER: ICD-10-CM

## 2025-06-18 RX ORDER — BUSPIRONE HYDROCHLORIDE 5 MG/1
5 TABLET ORAL 3 TIMES DAILY
Qty: 60 TABLET | Refills: 1 | Status: SHIPPED | OUTPATIENT
Start: 2025-06-18

## 2025-06-27 ENCOUNTER — OFFICE VISIT (OUTPATIENT)
Dept: UROLOGY | Facility: CLINIC | Age: 45
End: 2025-06-27
Payer: COMMERCIAL

## 2025-06-27 VITALS — WEIGHT: 309 LBS | HEIGHT: 72 IN | BODY MASS INDEX: 41.85 KG/M2

## 2025-06-27 DIAGNOSIS — N34.2 URETHRITIS: Primary | ICD-10-CM

## 2025-06-27 DIAGNOSIS — R30.0 DYSURIA: ICD-10-CM

## 2025-06-27 DIAGNOSIS — N35.919 STRICTURE OF MALE URETHRA, UNSPECIFIED STRICTURE TYPE: ICD-10-CM

## 2025-06-27 DIAGNOSIS — Z72.0 TOBACCO USE: ICD-10-CM

## 2025-06-27 LAB
BILIRUB BLD-MCNC: NEGATIVE MG/DL
CLARITY, POC: CLEAR
COLOR UR: YELLOW
EXPIRATION DATE: NORMAL
GLUCOSE UR STRIP-MCNC: NEGATIVE MG/DL
KETONES UR QL: NEGATIVE
LEUKOCYTE EST, POC: NEGATIVE
Lab: NORMAL
NITRITE UR-MCNC: NEGATIVE MG/ML
PH UR: 6 [PH] (ref 5–8)
PROT UR STRIP-MCNC: NEGATIVE MG/DL
RBC # UR STRIP: NEGATIVE /UL
SP GR UR: 1.03 (ref 1–1.03)
UROBILINOGEN UR QL: NORMAL

## 2025-06-27 RX ORDER — PHENAZOPYRIDINE HYDROCHLORIDE 100 MG/1
100 TABLET, FILM COATED ORAL 3 TIMES DAILY PRN
Qty: 20 TABLET | Refills: 0 | Status: SHIPPED | OUTPATIENT
Start: 2025-06-27

## 2025-06-27 RX ORDER — NITROFURANTOIN 25; 75 MG/1; MG/1
100 CAPSULE ORAL NIGHTLY
Qty: 30 CAPSULE | Refills: 2 | Status: SHIPPED | OUTPATIENT
Start: 2025-06-27 | End: 2025-07-27

## 2025-06-27 RX ORDER — OMEPRAZOLE 20 MG/1
20 CAPSULE, DELAYED RELEASE ORAL DAILY
COMMUNITY

## 2025-06-27 NOTE — PROGRESS NOTES
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  Patient reports this is becoming very bothersome to him.    Patient reports his symptoms have been ongoing for over 7 years intermittently, recently worsening the last 3 to 4 months, requiring p.o. antibiotics intermittently. He also reports urinary symptoms of frequency which he attributes to his excessive p.o. fluid intake.  Does report a weak urinary stream, hesitancy, but denies any straining to urinate.  He denies urgency, he does not have recurrent UTIs, denies any exposure to STIs in the past, denies any perineal pain or discomfort.  He reports nocturia 1 times, denies any pelvic pressure or suprapubic discomfort, flank pain, back pain, he does not have any CVA tenderness.      He is post lower tract investigation via cystoscopy in 2018 which he reports was intolerable.  Nevertheless his symptoms did resolve post cystoscopy for a few months and then reoccurred and he has had intermittent dysuria since then.He is post recent antibiotics from his PCP, urinalysis in clinic today is completely negative for leukocyte esterase, it is negative for nitrite, it is negative for gross/microscopic hematuria.  His IPSS score is 6 with a postvoid residual of 0 mL      Objective   Vital Signs:   Ht 182.2 cm (71.75\") Comment: pt stated  Wt (!) 140 kg (309 lb)   BMI 42.20 kg/m²       ROS:   Review of Systems   Constitutional:  Negative for activity change, appetite change, chills, diaphoresis, fatigue, fever, unexpected weight gain and unexpected weight loss.   HENT:  Negative for congestion, ear discharge, ear pain, nosebleeds, rhinorrhea, sinus " pressure and sore throat.    Eyes:  Negative for blurred vision, double vision, photophobia, pain, redness and visual disturbance.   Respiratory:  Negative for apnea, cough, chest tightness, shortness of breath, wheezing and stridor.    Cardiovascular:  Negative for chest pain and palpitations.   Gastrointestinal:  Negative for abdominal distention, abdominal pain, constipation, diarrhea, nausea and vomiting.   Endocrine: Negative for polydipsia, polyphagia and polyuria.   Genitourinary:  Positive for dysuria, nocturia and urgency. Negative for decreased urine volume, difficulty urinating, discharge, flank pain, frequency, genital sores, hematuria, penile pain, penile swelling, scrotal swelling, testicular pain and urinary incontinence.        She reports persistent dysuria, intermittent burning with urination   Musculoskeletal:  Negative for arthralgias, back pain and joint swelling.   Skin:  Negative for pallor, rash and wound.   Neurological:  Negative for dizziness, tremors, syncope, weakness, light-headedness, memory problem and confusion.   Psychiatric/Behavioral:  Negative for agitation, behavioral problems and decreased concentration.         Physical Exam  Constitutional:       General: He is not in acute distress.     Appearance: He is well-developed.   HENT:      Head: Normocephalic and atraumatic.      Right Ear: External ear normal.      Left Ear: External ear normal.   Eyes:      General:         Right eye: No discharge.         Left eye: No discharge.      Conjunctiva/sclera: Conjunctivae normal.      Pupils: Pupils are equal, round, and reactive to light.   Neck:      Thyroid: No thyromegaly.      Trachea: No tracheal deviation.   Cardiovascular:      Rate and Rhythm: Normal rate and regular rhythm.      Heart sounds: No murmur heard.     No friction rub.   Pulmonary:      Effort: Pulmonary effort is normal. No respiratory distress.      Breath sounds: Normal breath sounds. No stridor.   Abdominal:       General: Bowel sounds are normal. There is no distension.      Palpations: Abdomen is soft.      Tenderness: There is no abdominal tenderness. There is no guarding.   Genitourinary:     Penis: Normal and uncircumcised. No tenderness or discharge.       Testes: Normal.      Rectum: Normal. Guaiac result negative.      Comments: Male Gu: Soft nontender abdomen with no organomegaly, rigidity, or tenderness.  He has normal external genitalia and circumcised phallus withOUT a freely movable foreskin . Bilaterally descended testes without masses there is no inguinal hernias adenopathy or abnormalities he had good rectal tone and a large smooth firm prostate.  There is no nodularity or any suspicious rectal abnormalities  Urethral meatus very tight, with opening slightly close requiring slight dilatation with probe post lidocaine insertion.  Musculoskeletal:         General: No tenderness or deformity. Normal range of motion.      Cervical back: Normal range of motion and neck supple.   Skin:     General: Skin is warm and dry.      Capillary Refill: Capillary refill takes less than 2 seconds.      Coloration: Skin is not pale.   Neurological:      Mental Status: He is alert and oriented to person, place, and time.      Cranial Nerves: No cranial nerve deficit.      Coordination: Coordination normal.   Psychiatric:         Behavior: Behavior normal.         Thought Content: Thought content normal.         Judgment: Judgment normal.          Result Review :     Reviewed urinalysis, urine cultures,    RADIOLOGY (CT AND/OR KUB):    CT Abdomen and Pelvis: No results found for this or any previous visit.     CT Stone Protocol: No results found for this or any previous visit.     KUB: No results found for this or any previous visit.       LABS (3 MONTHS):    Office Visit on 06/27/2025   Component Date Value Ref Range Status    Color 06/27/2025 Yellow  Yellow, Straw, Dark Yellow, Cristina Final    Clarity, UA 06/27/2025 Clear   Clear Final    Specific Gravity  06/27/2025 1.030  1.005 - 1.030 Final    pH, Urine 06/27/2025 6.0  5.0 - 8.0 Final    Leukocytes 06/27/2025 Negative  Negative Final    Nitrite, UA 06/27/2025 Negative  Negative Final    Protein, POC 06/27/2025 Negative  Negative mg/dL Final    Glucose, UA 06/27/2025 Negative  Negative mg/dL Final    Ketones, UA 06/27/2025 Negative  Negative Final    Urobilinogen, UA 06/27/2025 Normal  Normal, 0.2 E.U./dL Final    Bilirubin 06/27/2025 Negative  Negative Final    Blood, UA 06/27/2025 Negative  Negative Final    Lot Number 06/27/2025 [id number]   Final    Expiration Date 06/27/2025 12/01/2026   Final   Office Visit on 04/10/2025   Component Date Value Ref Range Status    Color 04/10/2025 Yellow  Yellow, Straw, Dark Yellow, Cristina Final    Clarity, UA 04/10/2025 Clear  Clear Final    Glucose, UA 04/10/2025 Negative  Negative mg/dL Final    Bilirubin 04/10/2025 Negative  Negative Final    Ketones, UA 04/10/2025 Negative  Negative Final    Specific Gravity  04/10/2025 1.015  1.005 - 1.030 Final    Blood, UA 04/10/2025 Negative  Negative Final    pH, Urine 04/10/2025 6.0  5.0 - 8.0 Final    Protein, POC 04/10/2025 Negative  Negative mg/dL Final    Urobilinogen, UA 04/10/2025 0.2 E.U./dL  Normal, 0.2 E.U./dL Final    Leukocytes 04/10/2025 Negative  Negative Final    Nitrite, UA 04/10/2025 Negative  Negative Final        IPSS Questionnaire (AUA-7):  Over the past month…    1)  How often have you had a sensation of not emptying your bladder completely after you finish urinating?  0 - Not at all   2)  How often have you had to urinate again less than two hours after you finished urinating? 1 - Less than 1 time in 5   3)  How often have you found you stopped and started again several times when you urinated?  0 - Not at all   4) How difficult have you found it to postpone urination?  1 - Less than 1 time in 5   5) How often have you had a weak urinary stream?  2 - Less than half the time    6) How often have you had to push or strain to begin urination?  0 - Not at all   7) How many times did you most typically get up to urinate from the time you went to bed until the time you got up in the morning?  2 - 2 times   Total Score:  6       Assessment and Plan    Assessment & Plan  Urethritis  MR Iván Jeffers is a pleasant/overtly anxious 44 y.o. male who presents for for evaluation of urethritis, with dysuria, persistent burning with urination.  Patient reports he symptoms have been ongoing for 7 years, worsening the last 3 to 4 months and this is becoming very bothersome to him.    He denies any documented positive infections, denies any exposure to HSV, or STIs.  He denies any groin trauma. HIS urinalysis in clinic today is completely negative for leukocyte esterase, it is negative for nitrite, it is negative for gross/microscopic hematuria.  His IPSS score is 6 with a postvoid residual of 0 mL    EDUCATION: We discussed Urethritis, as a condition in which the urethra/ tube that carries urine from the bladder to outside the body, becomes inflamed and irritated.Urethritis typically causes pain while urinating and an increased urge to urinate. Although the primary cause of urethritis is usually infection by bacteria, we discussed bothersome symptoms he is experiencing such as: more frequent urge to urinate, discomfort during urination, burning or irritation at the urethral opening,and  ANY abnormal discharge from the penis that may also be present along with the urinary symptoms.-Patient denies any STI exposure/urethral discharge     We Discussed  Urethral stricture as a narrowing of the urethra, usually due to a prior injury or infection. This blocks the flow of urine output from the bladder. We discussed other causes such as : Congenital urethral strictures (present at birth) which are rare. And other causes to include inflammation of the urethra resulting from injury, trauma, previous surgery, or  infections. Symptoms of urethral stricture can range from no symptoms at all to complete urinary retention .We discussed Urethral Pain as occurrence of persistent or recurrent episodic urethral discomfort, usually on voiding with daytime Frequency, Urgency, and nocturia, in the absence of proven infection or other obvious pathology. This is a condition of uncertain etiology, as People with urethral syndrome have an inflamed or irritated urethra.     Discussed with patient that, Urethral discomfort has many of the same symptoms as urethritis, which is an infection and inflammation of the urethra. These symptoms include abdominal pain and frequent, painful urination and straining on urination like she has. Both conditions cause irritation to the urethra. Urethritis usually develops because of a bacteria or virus, but urethral syndrome often has no clear cause. Urethral syndrome has various causes. Common causes may include physical problems with the urethra, such as abnormal narrowing or urethral irritation or injury.    We also discussed several causes of DYSURIA/Urethral pain such as scented products, such as perfumes, soaps, bubble bath, and sanitary napkins,spermicidal jellies, certain foods and drinks containing caffeine, chemotherapy and radiation. We discussed causes from Injury to the urethra  caused by certain activities, such as: sexual activity, diaphragm use, tampon use, bike riding. We discussed the condition could be considered urethritis if a bacterial or viral infection is found. Finally, we discussed the fact that,  In some cases, however, tests won’t be able to find any infection.          PLAN  We resent HIS urine for  GUIDANCE PCR/culture. I will call  with results if any bacteria growth.    We discussed starting antibiotic therapy if it is positive.      Pyridium as needed for dysuria and is persistent burning with urination    Due to his discomfort on evaluation today, Recommend Macrobid twice  daily x 5 days and every night until follow-up.     I recommend concomitant probiotics with treatment with antibiotics to protect the rectal reservoir including over-the-counter yogurt preparations to filipe oral pills containing the appropriate probiotics. Patient reports the diligent use of Probiotics.    HE has been encouraged to increase  p.o. fluid intake to at least 1 to 2 L daily and avoid bladder irritants such as caffeine products, spicy foods, and citrusy foods.    Discussed URETHRAL Dilation - patient unable to tolerate in clinic-Deferred    Discussed scheduling for lower tract investigation via cystoscopy-deferred at this time per patient/requires sedation    Will see him back back for Follow up in clinic in 2 weeks and reevaluate his symptoms at that time.      He may return sooner if symptoms worsen     Patient is Agreeable plan of care     Orders:    POC Urinalysis Dipstick, Automated    nitrofurantoin, macrocrystal-monohydrate, (Macrobid) 100 MG capsule; Take 1 capsule by mouth Every Night for 30 days. START I CAPSULE BID X 5 DAYS FOR URETHRITIS    phenazopyridine (PYRIDIUM) 100 MG tablet; Take 1 tablet by mouth 3 (Three) Times a Day As Needed for Dysuria (STOP AFTER 3 DAYS, USE PRN ONLY).    Dysuria    Orders:    nitrofurantoin, macrocrystal-monohydrate, (Macrobid) 100 MG capsule; Take 1 capsule by mouth Every Night for 30 days. START I CAPSULE BID X 5 DAYS FOR URETHRITIS    phenazopyridine (PYRIDIUM) 100 MG tablet; Take 1 tablet by mouth 3 (Three) Times a Day As Needed for Dysuria (STOP AFTER 3 DAYS, USE PRN ONLY).    Stricture of male urethra, unspecified stricture type    Orders:    nitrofurantoin, macrocrystal-monohydrate, (Macrobid) 100 MG capsule; Take 1 capsule by mouth Every Night for 30 days. START I CAPSULE BID X 5 DAYS FOR URETHRITIS    phenazopyridine (PYRIDIUM) 100 MG tablet; Take 1 tablet by mouth 3 (Three) Times a Day As Needed for Dysuria (STOP AFTER 3 DAYS, USE PRN ONLY).    Tobacco  use           Patient reports that he is not currently experiencing any symptoms of urinary incontinence.      Smoking Cessation Counseling:  Current some day smoker. less than 3 minutes spent counseling. Will try to cut down.  I advised patient to quit tobacco use and offered support.  I provided patient with tobacco cessation educational material printed in the patient's After Visit Summary.       Follow Up   Return in about 17 days (around 7/14/2025) for Next scheduled follow up FOR URETHRITIS/DYSURIA/BURNING WITH URINATION.    Patient was given instructions and counseling regarding his condition or for health maintenance advice. Please see specific information pulled into the AVS if appropriate.          This document has been electronically signed by Griselda Cheng-Akwa, APRN   June 28, 2025 01:53 EDT

## 2025-06-27 NOTE — LETTER
"June 28, 2025     Kamran Vázquez MD  [address] [place] [address]    Patient: Iván Jeffers   YOB: 1980   Date of Visit: 6/27/2025       Dear Kamran Vázquez MD,    Thank you for referring Iván Jeffers to me for evaluation. Below is a copy of my consult note.    If you have questions, please do not hesitate to call me. I look forward to following Iván along with you.         Sincerely,        Griselda Cheng-Akwa, APRN        CC: No Recipients    Chief Complaint  Urethritis    Subjective    {CC  Problem List  Visit Diagnosis   Encounters  Notes  Medications  Labs  Result Review Imaging  Media :23}     History of Present Illness:  Iván Jeffers is a 44 y.o. male who presents for ***    Objective  Vital Signs:   Ht 182.2 cm (71.75\") Comment: pt stated  Wt (!) 140 kg (309 lb)   BMI 42.20 kg/m²       ROS:   Review of Systems     Physical Exam     Result Review:{ Labs  Result Review  Imaging  Med Tab  Media :23}     ***    RADIOLOGY (CT AND/OR KUB):    CT Abdomen and Pelvis: No results found for this or any previous visit.     CT Stone Protocol: No results found for this or any previous visit.     KUB: No results found for this or any previous visit.       LABS (3 MONTHS):    Office Visit on 06/27/2025   Component Date Value Ref Range Status    Color 06/27/2025 Yellow  Yellow, Straw, Dark Yellow, Cristina Final    Clarity, UA 06/27/2025 Clear  Clear Final    Specific Gravity  06/27/2025 1.030  1.005 - 1.030 Final    pH, Urine 06/27/2025 6.0  5.0 - 8.0 Final    Leukocytes 06/27/2025 Negative  Negative Final    Nitrite, UA 06/27/2025 Negative  Negative Final    Protein, POC 06/27/2025 Negative  Negative mg/dL Final    Glucose, UA 06/27/2025 Negative  Negative mg/dL Final    Ketones, UA 06/27/2025 Negative  Negative Final    Urobilinogen, UA 06/27/2025 Normal  Normal, 0.2 E.U./dL Final    Bilirubin 06/27/2025 Negative  Negative Final    Blood, UA 06/27/2025 Negative  Negative Final    Lot Number " 06/27/2025 [id number]   Final    Expiration Date 06/27/2025 12/01/2026   Final   Office Visit on 04/10/2025   Component Date Value Ref Range Status    Color 04/10/2025 Yellow  Yellow, Straw, Dark Yellow, Cristina Final    Clarity, UA 04/10/2025 Clear  Clear Final    Glucose, UA 04/10/2025 Negative  Negative mg/dL Final    Bilirubin 04/10/2025 Negative  Negative Final    Ketones, UA 04/10/2025 Negative  Negative Final    Specific Gravity  04/10/2025 1.015  1.005 - 1.030 Final    Blood, UA 04/10/2025 Negative  Negative Final    pH, Urine 04/10/2025 6.0  5.0 - 8.0 Final    Protein, POC 04/10/2025 Negative  Negative mg/dL Final    Urobilinogen, UA 04/10/2025 0.2 E.U./dL  Normal, 0.2 E.U./dL Final    Leukocytes 04/10/2025 Negative  Negative Final    Nitrite, UA 04/10/2025 Negative  Negative Final        IPSS Questionnaire (AUA-7):  Over the past month…    1)  How often have you had a sensation of not emptying your bladder completely after you finish urinating?  0 - Not at all   2)  How often have you had to urinate again less than two hours after you finished urinating? 1 - Less than 1 time in 5   3)  How often have you found you stopped and started again several times when you urinated?  0 - Not at all   4) How difficult have you found it to postpone urination?  1 - Less than 1 time in 5   5) How often have you had a weak urinary stream?  2 - Less than half the time   6) How often have you had to push or strain to begin urination?  0 - Not at all   7) How many times did you most typically get up to urinate from the time you went to bed until the time you got up in the morning?  2 - 2 times   Total Score:  6              Assessment and Plan {CC Problem List  Visit Diagnosis  ROS  Review (Popup)  Health Maintenance  Quality  BestPractice  Medications  SmartSets  SnapShot Encounters  Media :23}   Assessment & Plan  Urethritis      We discussed Urethritis, as a condition in which the urethra/ tube that carries  urine from the bladder to outside the body, becomes inflamed and irritated.Urethritis typically causes pain while urinating and an increased urge to urinate. Although the primary cause of urethritis is usually infection by bacteria, we discussed bothersome symptoms he is experiencing such as: more frequent urge to urinate, discomfort during urination, burning or irritation at the urethral opening,and  ANY abnormal discharge from the penis that may also be present along with the urinary symptoms.-Patient denies any STI exposure/urethral discharge     e Discussed  Urethral stricture as a narrowing of the urethra, usually due to a prior injury or infection. This blocks the flow of urine output from the bladder. We discussed other causes such as : Congenital urethral strictures (present at birth) which are rare. And other causes to include inflammation of the urethra resulting from injury, trauma, previous surgery, or infections. Symptoms of urethral stricture can range from no symptoms at all to complete urinary retention    We discussed Urethral Pain as occurrence of persistent or recurrent episodic urethral discomfort, usually on voiding with daytime Frequency, Urgency, and nocturia, in the absence of proven infection or other obvious pathology. This is a condition of uncertain etiology, as People with urethral syndrome have an inflamed or irritated urethra.     Discussed with patient that, Urethral discomfort has many of the same symptoms as urethritis, which is an infection and inflammation of the urethra. These symptoms include abdominal pain and frequent, painful urination and straining on urination like she has. Both conditions cause irritation to the urethra. Urethritis usually develops because of a bacteria or virus, but urethral syndrome often has no clear cause. Urethral syndrome has various causes. Common causes may include physical problems with the urethra, such as abnormal narrowing or urethral  irritation or injury.    We also discussed several causes of Urethral pain such as scented products, such as perfumes, soaps, bubble bath, and sanitary napkins,spermicidal jellies, certain foods and drinks containing caffeine, chemotherapy and radiation. We discussed causes from Injury to the urethra  caused by certain activities, such as: sexual activity, diaphragm use, tampon use, bike riding.     We discussed the condition could be considered urethritis if a bacterial or viral infection is found. Finally, we discussed the fact that,  In some cases, however, tests won’t be able to find any infection.          PLAN  We resent HIS urine for culture. I will call  with results if any bacteria growth.    We discussed starting antibiotic therapy if it is positive.  Recommend Macrobid twice daily x 5 days and every night until follow-up.    HE has been encouraged to increase her p.o. fluid intake to at least 1 to 2 L daily and avoid bladder irritants such as caffeine products, spicy foods, and citrusy foods.    Discussed ureteral dilation-patient unable to tolerate in clinic-deferred     I recommend concomitant probiotics with treatment with antibiotics to protect the rectal reservoir including over-the-counter yogurt preparations to filipe oral pills containing the appropriate probiotics. Patient reports the diligent use of Probiotics.    We discussed urethral dilatation in clinic today, patient wants to wait.    Will see her back for Follow up in clinic and reevaluate her urethra stricture at that time.     Orders:    POC Urinalysis Dipstick, Automated    nitrofurantoin, macrocrystal-monohydrate, (Macrobid) 100 MG capsule; Take 1 capsule by mouth Every Night for 30 days. START I CAPSULE BID X 5 DAYS FOR URETHRITIS    phenazopyridine (PYRIDIUM) 100 MG tablet; Take 1 tablet by mouth 3 (Three) Times a Day As Needed for Dysuria (STOP AFTER 3 DAYS, USE PRN ONLY).    Dysuria    Orders:    nitrofurantoin,  macrocrystal-monohydrate, (Macrobid) 100 MG capsule; Take 1 capsule by mouth Every Night for 30 days. START I CAPSULE BID X 5 DAYS FOR URETHRITIS    phenazopyridine (PYRIDIUM) 100 MG tablet; Take 1 tablet by mouth 3 (Three) Times a Day As Needed for Dysuria (STOP AFTER 3 DAYS, USE PRN ONLY).    Stricture of male urethra, unspecified stricture type    Orders:    nitrofurantoin, macrocrystal-monohydrate, (Macrobid) 100 MG capsule; Take 1 capsule by mouth Every Night for 30 days. START I CAPSULE BID X 5 DAYS FOR URETHRITIS    phenazopyridine (PYRIDIUM) 100 MG tablet; Take 1 tablet by mouth 3 (Three) Times a Day As Needed for Dysuria (STOP AFTER 3 DAYS, USE PRN ONLY).      Patient reports that he is not currently experiencing any symptoms of urinary incontinence.      Smoking Cessation Counseling:  Current some day smoker. less than 3 minutes spent counseling. Will try to cut down.  I advised patient to quit tobacco use and offered support.  I provided patient with tobacco cessation educational material printed in the patient's After Visit Summary.       Follow Up {Instructions Charge Capture  Follow-up Communications :23}  Return in about 2 weeks (around 7/11/2025) for Next scheduled follow up FOR URETHRITIS/DYSURIA/BURNING WITH URINATION.    Patient was given instructions and counseling regarding his condition or for health maintenance advice. Please see specific information pulled into the AVS if appropriate.          This document has been electronically signed by Griselda Cheng-Akwa, APRN   June 27, 2025 10:35 EDT

## 2025-06-27 NOTE — ASSESSMENT & PLAN NOTE
MR Iván Jeffers is a pleasant/overtly anxious 44 y.o. male who presents for for evaluation of urethritis, with dysuria, persistent burning with urination.  Patient reports he symptoms have been ongoing for 7 years, worsening the last 3 to 4 months and this is becoming very bothersome to him.    He denies any documented positive infections, denies any exposure to HSV, or STIs.  He denies any groin trauma. HIS urinalysis in clinic today is completely negative for leukocyte esterase, it is negative for nitrite, it is negative for gross/microscopic hematuria.  His IPSS score is 6 with a postvoid residual of 0 mL    EDUCATION: We discussed Urethritis, as a condition in which the urethra/ tube that carries urine from the bladder to outside the body, becomes inflamed and irritated.Urethritis typically causes pain while urinating and an increased urge to urinate. Although the primary cause of urethritis is usually infection by bacteria, we discussed bothersome symptoms he is experiencing such as: more frequent urge to urinate, discomfort during urination, burning or irritation at the urethral opening,and  ANY abnormal discharge from the penis that may also be present along with the urinary symptoms.-Patient denies any STI exposure/urethral discharge     We Discussed  Urethral stricture as a narrowing of the urethra, usually due to a prior injury or infection. This blocks the flow of urine output from the bladder. We discussed other causes such as : Congenital urethral strictures (present at birth) which are rare. And other causes to include inflammation of the urethra resulting from injury, trauma, previous surgery, or infections. Symptoms of urethral stricture can range from no symptoms at all to complete urinary retention .We discussed Urethral Pain as occurrence of persistent or recurrent episodic urethral discomfort, usually on voiding with daytime Frequency, Urgency, and nocturia, in the absence of proven infection or  other obvious pathology. This is a condition of uncertain etiology, as People with urethral syndrome have an inflamed or irritated urethra.     Discussed with patient that, Urethral discomfort has many of the same symptoms as urethritis, which is an infection and inflammation of the urethra. These symptoms include abdominal pain and frequent, painful urination and straining on urination like she has. Both conditions cause irritation to the urethra. Urethritis usually develops because of a bacteria or virus, but urethral syndrome often has no clear cause. Urethral syndrome has various causes. Common causes may include physical problems with the urethra, such as abnormal narrowing or urethral irritation or injury.    We also discussed several causes of DYSURIA/Urethral pain such as scented products, such as perfumes, soaps, bubble bath, and sanitary napkins,spermicidal jellies, certain foods and drinks containing caffeine, chemotherapy and radiation. We discussed causes from Injury to the urethra  caused by certain activities, such as: sexual activity, diaphragm use, tampon use, bike riding. We discussed the condition could be considered urethritis if a bacterial or viral infection is found. Finally, we discussed the fact that,  In some cases, however, tests won’t be able to find any infection.          PLAN  We resent HIS urine for  GUIDANCE PCR/culture. I will call  with results if any bacteria growth.    We discussed starting antibiotic therapy if it is positive.      Pyridium as needed for dysuria and is persistent burning with urination    Due to his discomfort on evaluation today, Recommend Macrobid twice daily x 5 days and every night until follow-up.     I recommend concomitant probiotics with treatment with antibiotics to protect the rectal reservoir including over-the-counter yogurt preparations to filipe oral pills containing the appropriate probiotics. Patient reports the diligent use of Probiotics.    HE  has been encouraged to increase  p.o. fluid intake to at least 1 to 2 L daily and avoid bladder irritants such as caffeine products, spicy foods, and citrusy foods.    Discussed URETHRAL Dilation - patient unable to tolerate in clinic-Deferred    Discussed scheduling for lower tract investigation via cystoscopy-deferred at this time per patient/requires sedation    Will see him back back for Follow up in clinic in 2 weeks and reevaluate his symptoms at that time.      He may return sooner if symptoms worsen     Patient is Agreeable plan of care     Orders:    POC Urinalysis Dipstick, Automated    nitrofurantoin, macrocrystal-monohydrate, (Macrobid) 100 MG capsule; Take 1 capsule by mouth Every Night for 30 days. START I CAPSULE BID X 5 DAYS FOR URETHRITIS    phenazopyridine (PYRIDIUM) 100 MG tablet; Take 1 tablet by mouth 3 (Three) Times a Day As Needed for Dysuria (STOP AFTER 3 DAYS, USE PRN ONLY).

## 2025-06-27 NOTE — ASSESSMENT & PLAN NOTE
Orders:    nitrofurantoin, macrocrystal-monohydrate, (Macrobid) 100 MG capsule; Take 1 capsule by mouth Every Night for 30 days. START I CAPSULE BID X 5 DAYS FOR URETHRITIS    phenazopyridine (PYRIDIUM) 100 MG tablet; Take 1 tablet by mouth 3 (Three) Times a Day As Needed for Dysuria (STOP AFTER 3 DAYS, USE PRN ONLY).

## 2025-06-28 NOTE — PATIENT INSTRUCTIONS
Steps to Quit Smoking  Smoking tobacco is the leading cause of preventable death. It can affect almost every organ in the body. Smoking puts you and those around you at risk for developing many serious chronic diseases.  Quitting smoking can be very challenging. Do not get discouraged if you are not successful the first time. Some people need to make many attempts to quit before they achieve long-term success. Do your best to stick to your quit plan, and talk with your health care provider if you have any questions or concerns.  How do I get ready to quit?  When you decide to quit smoking, create a plan to help you succeed. Before you quit:  Pick a date to quit. Set a date within the next 2 weeks to give you time to prepare.  Write down the reasons why you are quitting. Keep this list in places where you will see it often.  Tell your family, friends, and co-workers that you are quitting. Support from people you are close to can make quitting easier.  Talk with your health care provider about your options for quitting smoking.  Find out what treatment options are covered by your health insurance.  Identify people, places, things, and activities that make you want to smoke (triggers). Avoid them.  What first steps can I take to quit smoking?  Throw away all cigarettes at home, at work, and in your car.  Throw away smoking accessories, such as ashtrays and lighters.  Clean your car. Make sure to empty the ashtray.  Clean your home, including curtains and carpets.  What strategies can I use to quit smoking?  Talk with your health care provider about combining strategies, such as taking medicines while you are also receiving in-person counseling. Using these two strategies together makes you more likely to succeed in quitting than if you used either strategy on its own.  If you are pregnant or breastfeeding, talk with your health care provider about finding counseling or other support strategies to quit smoking. Do not  take medicine to help you quit smoking unless your health care provider tells you to.  Quit right away  Quit smoking completely, instead of gradually reducing how much you smoke over a period of time. Stopping smoking right away may be more successful than gradually quitting.  Attend in-person counseling to help you build problem-solving skills. You are more likely to succeed in quitting if you attend counseling sessions regularly. Even short sessions of 10 minutes can be effective.  Take medicine  You may take medicines to help you quit smoking. Some medicines require a prescription. You can also purchase over-the-counter medicines. Medicines may have nicotine in them to replace the nicotine in cigarettes. Medicines may:  Help to stop cravings.  Help to relieve withdrawal symptoms.  Your health care provider may recommend:  Nicotine patches, gum, or lozenges.  Nicotine inhalers or sprays.  Non-nicotine medicine that you take by mouth.  Find resources  Find resources and support systems that can help you quit smoking and remain smoke-free after you quit. These resources are most helpful when you use them often. They include:  Online chats with a counselor.  Telephone quitlines.  Printed self-help materials.  Support groups or group counseling.  Text messaging programs.  Mobile phone apps or applications. Use apps that can help you stick to your quit plan by providing reminders, tips, and encouragement. Examples of free services include Quit Guide from the CDC and smokefree.gov    What can I do to make it easier to quit?    Reach out to your family and friends for support and encouragement. Call telephone quitlines, such as 1800-QUIT-NOW, reach out to support groups, or work with a counselor for support.  Ask people who smoke to avoid smoking around you.  Avoid places that trigger you to smoke, such as bars, parties, or smoke-break areas at work.  Spend time with people who do not smoke.  Lessen the stress in your  "life. Stress can be a smoking trigger for some people. To lessen stress, try:  Exercising regularly.  Doing deep-breathing exercises.  Doing yoga.  Meditating.  What benefits will I see if I quit smoking?  Over time, you should start to see positive results, such as:  Improved sense of smell and taste.  Decreased coughing and sore throat.  Slower heart rate.  Lower blood pressure.  Clearer and healthier skin.  The ability to breathe more easily.  Fewer sick days.  Summary  Quitting smoking can be very challenging. Do not get discouraged if you are not successful the first time. Some people need to make many attempts to quit before they achieve long-term success.  When you decide to quit smoking, create a plan to help you succeed.  Quit smoking right away, not slowly over a period of time.  Find resources and support systems that can help you quit smoking and remain smoke-free after you quit.  This information is not intended to replace advice given to you by your health care provider. Make sure you discuss any questions you have with your health care provider.  Document Revised: 12/09/2022 Document Reviewed: 12/09/2022  [employer] Patient Education © 2024 [employer]  Smokeless Tobacco Information, Adult    Tobacco is a leafy plant that contains a chemical called nicotine. Tobacco use is harmful to your health. Nicotine affects the chemicals in your brain, and this can cause you to crave nicotine. These cravings can lead to addiction. Smokeless tobacco is tobacco that you put in your mouth instead of smoking it. It may also be called chewing tobacco or snuff.  Smokeless tobacco is made from the leaves of tobacco plants and comes in many forms, such as:  Loose, dry leaves.  Plugs or twists.  Moist pouches.  Dissolving lozenges or strips.  Chewing, sucking, or holding the tobacco in your mouth causes your mouth to make more saliva. The saliva mixes with the tobacco to make tobacco \"juice\" that is swallowed or spit " out.  How can smokeless tobacco use affect me?  All forms of tobacco contain many chemicals that can harm every organ in the body. Using smokeless tobacco increases your risk for:  Cancer. Tobacco use is one of the leading causes of cancer. Smokeless tobacco is linked to cancer of the mouth, esophagus, and pancreas.  Other long-term health problems, including high blood pressure, heart disease, and stroke.  Addiction.  Pregnancy complications. Pregnant women who use smokeless tobacco are more likely to have a miscarriage or deliver a baby too early (premature delivery).  Mouth and dental problems, such as:  Bad breath.  Teeth that look yellow or brown.  Mouth sores.  Cracking and bleeding lips.  Gum recession, gum disease, or tooth decay.  Lesions on the soft tissues of your mouth (leukoplakia).  The benefits of not using smokeless tobacco include:  A healthy mind and body.  Saving money. You avoid the cost of buying tobacco and the cost of treating illnesses that are caused by tobacco.  What actions can I take to quit using tobacco?  Ask your health care provider for help to quit using smokeless tobacco. This may involve treatment.  These tips may also help you quit:  Pick a date to quit. Set a date within the next 2 weeks. This gives you time to prepare.  Write down the reasons why you are quitting. Keep this list in places where you will see it often, such as on your bathroom mirror or in your car or wallet.  Identify the people, places, things, and activities that make you want to use smokeless tobacco (triggers). Avoid them.  Tell your family and friends that you are quitting. Look for a support group in your area. Having support can make quitting easier.  Get rid of any tobacco you have and remove any tobacco smells. To do this:  Throw away all containers of tobacco at home, at work, and in your car.  Throw away any other items that you use regularly when you chew tobacco.  Clean your car and make sure to  remove all tobacco-related items.  Clean your home, including curtains and carpets.  Keep track of how many days have passed since you quit. It may help to cross days off a calendar. Remembering how long and hard you have worked to quit can help you avoid using smokeless tobacco again.  Stay positive. Be prepared for cravings. It is common to slip up when you first quit, so take it one day at a time.  Stay busy and take care of your body. Get plenty of exercise. Drink enough water to keep your urine pale yellow.  Where to find support  Ask your health care provider if there is a local support group for quitting smokeless tobacco and any available online resources, such as:  Smoke Free: www.veterans.smokefree.gov  Be Tobacco Free: www.betobaccofree.WritePath.gov  Tobacco Quitline: 9-990-QUIT-VET (1-751.631.2600).  Hotlines, such as 5-019-QUIT-NOW (1-309.777.1273).  Where to find more information  You can learn more about the risks of using smokeless tobacco and the benefits of quitting from these sources:  American Cancer Society: www.cancer.org  National Cancer [place]: www.cancer.gov  Centers for Disease Control and Prevention: www.cdc.gov  Food and Drug Administration: www.fda.gov/tobacco-products  Contact a health care provider if you have:  Trouble quitting smokeless tobacco use on your own.  White or other discolored patches in your mouth.  Difficulty swallowing.  A change in your voice.  Unexplained weight loss.  Stomach pain, nausea, or vomiting.  Summary  Smokeless tobacco contains many different chemicals that are known to cause cancer.  Nicotine is an addictive chemical in smokeless tobacco that affects your brain.  The benefits of not using smokeless tobacco include having a healthy mind and body and saving money.  Tell your family and friends that you are quitting. Having support can make quitting easier.  Ask your health care provider for help quitting smokeless tobacco. This may involve treatment.  This  information is not intended to replace advice given to you by your health care provider. Make sure you discuss any questions you have with your health care provider.  Document Revised: 09/14/2022 Document Reviewed: 09/14/2022  [employer] Patient Education © 2024 [employer]  Electronic Cigarette Information    Electronic cigarettes, or e-cigarettes, are battery-operated devices that deliver nicotine--a very addictive drug--to the body. They come in many shapes, including in the shape of a cigarette, pipe, pen, and even a USB memory stick. Electronic cigarettes may be called e-cigs, e-hookahs, vape pens, and electronic nicotine delivery systems (ENDS).  E-cigarettes have a cartridge that contains a liquid form of nicotine. When a person uses the device, the liquid heats up. It then becomes a vapor that a person inhales. Using e-cigarettes may be called vaping.  Nicotine is thought to increase your risk for certain types of cancer. In addition to nicotine, e-cigarettes may contain other harmful and cancer-causing chemicals, including:  Formaldehyde.  Acetaldehyde.  Heavy metals.  Ultrafine particles that can get inhaled deep into the lungs.  Chemical colorings and flavorings.  It is not clear how much nicotine you get when vaping, and it is hard to know what chemicals are in the vaping liquids. The health effects of vaping are not completely known, but you should be aware of the possible dangers of using these products.  Some people may use e-cigarettes to quit smoking tobacco. However, this has not been proven to work, and the Food and Drug Administration (FDA) has not approved e-cigarettes for this purpose.  How can using electronic cigarettes affect me?  You may be at risk for developing a dangerous lung disease. There are reports of an increasing number of cases involving serious lung problems, and even death, associated with e-cigarette use. Your risk may be even higher if you:  Buy e-cigarettes or vaping oils  off the street.  Add any substances to the e-cigarettes that are not intended by the .  Vaping may make you crave nicotine. Nicotine does the following:  Changes your blood sugar levels.  Increases your heart rate, blood pressure, and breathing rate.  Increases your risk of developing blood clots (hypercoagulable state) and diabetes.  Increases your risk of gum disease that may lead to losing teeth.  If you smoke e-cigarettes, you may be more likely to start smoking or to smoke more tobacco cigarettes.  Becoming addicted to nicotine may make your brain more sensitive to other addictive drugs. You may move to other addictive substances.  You may be in danger of overdosing on nicotine. Nicotine poisoning can cause nausea, vomiting, seizures, and trouble breathing.  Vaping has also been linked to decreases in memory and attention span in children and teens.  If you are pregnant, the nicotine in e-cigarettes may be harmful to your baby. Nicotine can cause:  Brain or lung problems for your baby.  Your baby to be born too early.  Your baby to be born with a low birth weight.  What actions can I take to stop vaping?  If you can, stop vaping on your own before you become addicted to nicotine. If you need help quitting, ask your health care provider. There are three effective ways to fight nicotine addiction:  Nicotine replacement therapy. Using nicotine gum or a nicotine patch blocks your craving for nicotine. Over time, you can reduce the amount of nicotine you use until you can stop using nicotine completely without having cravings.  Prescription medicines approved to fight nicotine addiction. These stop nicotine cravings or block the effects of nicotine.  Behavioral therapy. This may include:  A self-help smoking cessation program.  Individual or group therapy.  A smoking cessation support group.  There are several national programs to help you quit smoking or vaping. These include:  Text message programs,  such as SmokefreeTXT.  Apps for mobile phones, including the free [employer] rex.  Hotlines, such as -800-QUIT-NOW (1-238.477.1767).  Where to find support  You can get support at these sites:  U.S. Department of Health and Human Services: smokefree.gov  American Lung Association: www.lung.org  Where to find more information  Learn more about e-cigarettes from:  National [place] on Drug Abuse: www.drugabuse.gov  Centers for Disease Control and Prevention: www.cdc.gov  Summary  E-cigarettes can cause nicotine addiction.  E-cigarettes are not approved as a way to stop smoking. They are not a risk-free alternative to smoking tobacco.  There are reports of an increasing number of cases involving serious lung problems, and even death, associated with e-cigarette use.  If you can stop vaping on your own, do it before you become addicted to nicotine. If you need help quitting, ask your health care provider.  There are various methods and programs that can help you stop smoking or vaping.  This information is not intended to replace advice given to you by your health care provider. Make sure you discuss any questions you have with your health care provider.  Document Revised: 09/14/2022 Document Reviewed: 09/14/2022  [employer] Patient Education © 2024 [employer]

## 2025-07-30 DIAGNOSIS — F41.1 GENERALIZED ANXIETY DISORDER: ICD-10-CM

## 2025-07-30 RX ORDER — BUSPIRONE HYDROCHLORIDE 5 MG/1
5 TABLET ORAL 3 TIMES DAILY
Qty: 60 TABLET | Refills: 1 | Status: SHIPPED | OUTPATIENT
Start: 2025-07-30 | End: 2025-08-01 | Stop reason: SDUPTHER

## 2025-08-01 DIAGNOSIS — F41.1 GENERALIZED ANXIETY DISORDER: ICD-10-CM

## 2025-08-01 RX ORDER — BUSPIRONE HYDROCHLORIDE 5 MG/1
5 TABLET ORAL 3 TIMES DAILY
Qty: 60 TABLET | Refills: 1 | Status: SHIPPED | OUTPATIENT
Start: 2025-08-01

## 2025-08-01 NOTE — TELEPHONE ENCOUNTER
Caller: Iván Jeffers    Relationship: Self    Best call back number:[phone]      Requested Prescriptions:   Requested Prescriptions     Pending Prescriptions Disp Refills    busPIRone (BUSPAR) 5 MG tablet 60 tablet 1     Sig: Take 1 tablet by mouth 3 (Three) Times a Day.        Pharmacy where request should be sent: [facility] [address] [place] [address] [phone] [facility] [phone] FX     Last office visit with prescribing clinician: 4/10/2025   Last telemedicine visit with prescribing clinician: Visit date not found   Next office visit with prescribing clinician: Visit date not found     Additional details provided by patient: PT STATED RX WAS SENT [facility], THEY ARE CLOSED AND WILL BE CLOSED ON WEEKEND, PT REQUEST REFILL TO BE SENT TO [facility]    Does the patient have less than a 3 day supply:  [x] Yes  [] No      Nilda Casillas Rep   08/01/25 10:32 EDT

## 2025-08-26 ENCOUNTER — TELEPHONE (OUTPATIENT)
Dept: FAMILY MEDICINE CLINIC | Facility: CLINIC | Age: 45
End: 2025-08-26
Payer: COMMERCIAL